# Patient Record
Sex: FEMALE | Race: WHITE | NOT HISPANIC OR LATINO | Employment: OTHER | URBAN - METROPOLITAN AREA
[De-identification: names, ages, dates, MRNs, and addresses within clinical notes are randomized per-mention and may not be internally consistent; named-entity substitution may affect disease eponyms.]

---

## 2017-01-25 DIAGNOSIS — J45.909 UNCOMPLICATED ASTHMA, UNSPECIFIED ASTHMA SEVERITY: ICD-10-CM

## 2017-01-25 RX ORDER — ALBUTEROL SULFATE 90 UG/1
AEROSOL, METERED RESPIRATORY (INHALATION)
Qty: 3 INHALER | Refills: 0 | Status: SHIPPED | OUTPATIENT
Start: 2017-01-25 | End: 2017-03-31 | Stop reason: SDUPTHER

## 2017-01-25 NOTE — TELEPHONE ENCOUNTER
Caller Name: Linda Garcia                 Call Back Number: [phone] (home)         Patient approves a detailed voicemail message: N\A    Have we ever prescribed this med? Yes.  If yes, what date? 10/24/16    Last OV: last seen at [facility]    Next OV: 3/6/17    DX: asthma    Medications:  Current Outpatient Prescriptions   Medication Sig Dispense Refill   • XYZAL 5 MG Tab TAKE 1 TABLET BY MOUTH EVERY DAY AS NEEDED 90 Tab 0   • VENTOLIN  (90 BASE) MCG/ACT Aero Soln inhalation aerosol INHALE 2 PUFFS BY MOUTH EVERY 4 HOURS AS NEEDED SHORTNESS OF BREATH 3 Inhaler 0   • SINGULAIR 10 MG Tab TAKE 1 TABLET BY MOUTH EVERY DAY 90 Tab 0   • simvastatin (ZOCOR) 5 MG Tab Take 5 mg by mouth every evening.     • Beclomethasone Dipropionate (QVAR INH) Inhale  by mouth.     • metoprolol (LOPRESSOR) 25 MG TABS Take 1 Tab by mouth 4 times a day as needed (palpitations). 360 Tab 1   • albuterol (VENTOLIN OR PROVENTIL) 108 (90 BASE) MCG/ACT AERS Inhale 2 Puffs by mouth every 6 hours as needed.       No current facility-administered medications for this visit.

## 2017-01-25 NOTE — TELEPHONE ENCOUNTER
Caller Name: Linda Garcia                 Call Back Number: [phone] (home)         Patient approves a detailed voicemail message: N\A    Have we ever prescribed this med? Yes.  If yes, what date? Both 10/24/16    Last OV: 10/9/15 [facility]    Next OV: 3/6/17 with [place] [facility]    DX: Asthma     Medications:  Current Outpatient Prescriptions   Medication Sig Dispense Refill   • XYZAL 5 MG Tab TAKE 1 TABLET BY MOUTH EVERY DAY AS NEEDED 90 Tab 0   • VENTOLIN  (90 BASE) MCG/ACT Aero Soln inhalation aerosol INHALE 2 PUFFS BY MOUTH EVERY 4 HOURS AS NEEDED SHORTNESS OF BREATH 3 Inhaler 0   • SINGULAIR 10 MG Tab TAKE 1 TABLET BY MOUTH EVERY DAY 90 Tab 0   • simvastatin (ZOCOR) 5 MG Tab Take 5 mg by mouth every evening.     • Beclomethasone Dipropionate (QVAR INH) Inhale  by mouth.     • metoprolol (LOPRESSOR) 25 MG TABS Take 1 Tab by mouth 4 times a day as needed (palpitations). 360 Tab 1   • albuterol (VENTOLIN OR PROVENTIL) 108 (90 BASE) MCG/ACT AERS Inhale 2 Puffs by mouth every 6 hours as needed.       No current facility-administered medications for this visit.

## 2017-01-27 ENCOUNTER — TELEPHONE (OUTPATIENT)
Dept: PULMONOLOGY | Facility: HOSPICE | Age: 52
End: 2017-01-27

## 2017-01-27 NOTE — TELEPHONE ENCOUNTER
MEDICATION PRIOR AUTHORIZATION NEEDED:    1. Name of Medication: Singulair 10mg    2. Requested By (Name of Pharmacy): [facility]     3. Is insurance on file current? yes    4. What is the name & phone number of the 3rd party payor? [facility] [phone]

## 2017-01-27 NOTE — TELEPHONE ENCOUNTER
MEDICATION PRIOR AUTHORIZATION NEEDED:    1. Name of Medication: Ventolin 108 (90 base)mcg    2. Requested By (Name of Pharmacy): [facility]     3. Is insurance on file current? yes    4. What is the name & phone number of the 3rd party payor? [facility] [phone]

## 2017-01-27 NOTE — TELEPHONE ENCOUNTER
MEDICATION PRIOR AUTHORIZATION NEEDED:    1. Name of Medication: Xyzal 5mg    2. Requested By (Name of Pharmacy): [facility]     3. Is insurance on file current? yes    4. What is the name & phone number of the 3rd party payor? [facility] [phone]

## 2017-02-14 NOTE — TELEPHONE ENCOUNTER
DOCUMENTATION OF PRIOR AUTH STATUS    1. Medication name and dose: Ventolin 108 (90base) mcg    2. Name and Phone # of Prescription coverage company: [employer] [phone]    3. Date Prior Auth was submitted: 1/27/2017    4. What information was given to obtain insurance decision: Clinical notes    5. Prior Auth letter Approved or Denied: Approved, Copay exception approved    6. Pharmacy notified: Yes    7. Patient notified: Yes

## 2017-02-14 NOTE — TELEPHONE ENCOUNTER
DOCUMENTATION OF PRIOR AUTH STATUS    1. Medication name and dose: Singulair 10mg    2. Name and Phone # of Prescription coverage company: [employer] [phone]    3. Date Prior Auth was submitted: 1/27/2017    4. What information was given to obtain insurance decision: Clinical notes    5. Prior Auth letter Approved or Denied: Approved, Copay exception approved    6. Pharmacy notified: Yes    7. Patient notified: Yes

## 2017-02-28 ENCOUNTER — OFFICE VISIT (OUTPATIENT)
Dept: PULMONOLOGY | Facility: HOSPICE | Age: 52
End: 2017-02-28
Payer: COMMERCIAL

## 2017-02-28 ENCOUNTER — NON-PROVIDER VISIT (OUTPATIENT)
Dept: PULMONOLOGY | Facility: HOSPICE | Age: 52
End: 2017-02-28
Payer: COMMERCIAL

## 2017-02-28 VITALS
SYSTOLIC BLOOD PRESSURE: 120 MMHG | BODY MASS INDEX: 36.16 KG/M2 | OXYGEN SATURATION: 94 % | TEMPERATURE: 97.5 F | RESPIRATION RATE: 18 BRPM | HEIGHT: 66 IN | WEIGHT: 225 LBS | DIASTOLIC BLOOD PRESSURE: 74 MMHG | HEART RATE: 82 BPM

## 2017-02-28 DIAGNOSIS — J30.1 NON-SEASONAL ALLERGIC RHINITIS DUE TO POLLEN: ICD-10-CM

## 2017-02-28 DIAGNOSIS — J45.30 MILD PERSISTENT ASTHMA WITHOUT COMPLICATION: ICD-10-CM

## 2017-02-28 DIAGNOSIS — G47.33 OSA (OBSTRUCTIVE SLEEP APNEA): ICD-10-CM

## 2017-02-28 PROCEDURE — 99214 OFFICE O/P EST MOD 30 MIN: CPT | Performed by: INTERNAL MEDICINE

## 2017-02-28 ASSESSMENT — PULMONARY FUNCTION TESTS
FVC: 2.78
FEV1/FVC_PERCENT_PREDICTED: 95
FVC_PREDICTED: 3.78
FEV1/FVC_PREDICTED: 78.84
FEV1/FVC: 74
FEV1_PREDICTED: 2.98
FEV1_PERCENT_CHANGE: 25
FEV1: 2.68
FVC: 3.48
FEV1: 2.07
FEV1/FVC_PERCENT_CHANGE: 116
FEV1_PREDICTED: 69
FEV1_PERCENT_CHANGE: 29
FEV1/FVC: 77.01
FVC_PERCENT_PREDICTED: 73

## 2017-02-28 ASSESSMENT — PATIENT HEALTH QUESTIONNAIRE - PHQ9: CLINICAL INTERPRETATION OF PHQ2 SCORE: 0

## 2017-02-28 NOTE — MR AVS SNAPSHOT
Linda Garcia   2017 1:30 PM   Non-Provider Visit   MRN: [id number]    Department:  [facility]   Dept Phone:  [phone]    Description:  Female : 1965   Provider:  Malena Lei M.D.           Reason for Visit     Asthma           Allergies as of 2017     Allergen Noted Reactions    Asa [Aspirin] 2016       Gadolinium 2013       HIVES    Red Dye 2013       [place] 2013       Sulfa Drugs 2013         You were diagnosed with     Mild persistent asthma without complication   [475523]         Vital Signs     Smoking Status                   Passive Smoke Exposure - Never Smoker           Basic Information     Date Of Birth Sex Race Ethnicity Preferred Language    1965 Female White Non- English      Problem List              ICD-10-CM Priority Class Noted - Resolved    DUB (dysfunctional uterine bleeding), s/p YANDY  N93.8   2013 - Present    ASTHMA    2013 - Present    Palpitations R00.2   2013 - Present      Health Maintenance        Date Due Completion Dates    IMM DTaP/Tdap/Td Vaccine (1 - Tdap) 1985 ---    IMM PNEUMOCOCCAL 19-64 (ADULT) MEDIUM RISK SERIES (1 of 1 - PPSV23) 1985 ---    PAP SMEAR 1987 ---    COLONOSCOPY 2016 ---    IMM INFLUENZA (1) 2016 ---    MAMMOGRAM 10/25/2017 10/25/2016, 2015, 2014, 2013, 2012, 2010, 2010, 12/3/2009, 12/3/2009, 10/3/2008, 10/3/2008, 2007, 2007, 2006, 2006, 2005            Current Immunizations     No immunizations on file.      Below and/or attached are the medications your provider expects you to take. Review all of your home medications and newly ordered medications with your provider and/or pharmacist. Follow medication instructions as directed by your provider and/or pharmacist. Please keep your medication list with you and share with your provider. Update the information when medications are  discontinued, doses are changed, or new medications (including over-the-counter products) are added; and carry medication information at all times in the event of emergency situations     Allergies:  ASA - (reactions not documented)     GADOLINIUM - (reactions not documented)     RED DYE - (reactions not documented)     STRAWBERRY - (reactions not documented)     SULFA DRUGS - (reactions not documented)               Medications  Valid as of: February 28, 2017 -  2:35 PM    Generic Name Brand Name Tablet Size Instructions for use    Albuterol Sulfate (Aero Soln) albuterol 108 (90 BASE) MCG/ACT Inhale 2 Puffs by mouth every 6 hours as needed.        Albuterol Sulfate (Aero Soln) VENTOLIN  (90 BASE) MCG/ACT INHALE 2 PUFFS BY MOUTH EVERY 4 HOURS AS NEEDED SHORTNESS OF BREATH        Albuterol Sulfate (Aero Soln) VENTOLIN  (90 BASE) MCG/ACT INHALE 2 PUFFS BY MOUTH EVERY 4 HOURS AS NEEDED SHORTNESS OF BREATH        Beclomethasone Dipropionate   Inhale  by mouth.        Beclomethasone Dipropionate (Aero Soln) QVAR 40 MCG/ACT Inhale 1 Puff by mouth 2 Times a Day.        Beclomethasone Dipropionate (Aero Soln) QVAR 80 MCG/ACT Inhale 2 Puffs by mouth 2 times a day. Use spacer. Rinse mouth after each use.        Levocetirizine Dihydrochloride (Tab) XYZAL 5 MG TAKE 1 TABLET BY MOUTH EVERY DAY AS NEEDED        Metoprolol Tartrate (Tab) LOPRESSOR 25 MG Take 1 Tab by mouth 4 times a day as needed (palpitations).        Montelukast Sodium (Tab) SINGULAIR 10 MG TAKE 1 TABLET BY MOUTH EVERY DAY        Simvastatin (Tab) ZOCOR 5 MG Take 5 mg by mouth every evening.        .                 Medicines prescribed today were sent to:     [id number] - [place], [place] - [address] [facility] [address]    [address] [place] [address]    Phone: [phone] Fax: [phone]    Open 24 Hours?: No      Medication refill instructions:       If your prescription bottle indicates you have medication  refills left, it is not necessary to call your provider’s office. Please contact your pharmacy and they will refill your medication.    If your prescription bottle indicates you do not have any refills left, you may request refills at any time through one of the following ways: The online Donde system (except Urgent Care), by calling your provider’s office, or by asking your pharmacy to contact your provider’s office with a refill request. Medication refills are processed only during regular business hours and may not be available until the next business day. Your provider may request additional information or to have a follow-up visit with you prior to refilling your medication.   *Please Note: Medication refills are assigned a new Rx number when refilled electronically. Your pharmacy may indicate that no refills were authorized even though a new prescription for the same medication is available at the pharmacy. Please request the medicine by name with the pharmacy before contacting your provider for a refill.           Donde Access Code: [email or web address]  Expires: 3/2/2017 12:12 PM    Donde  A secure, online tool to manage your health information     [employer]’s Donde® is a secure, online tool that connects you to your personalized health information from the privacy of your home -- day or night - making it very easy for you to manage your healthcare. Once the activation process is completed, you can even access your medical information using the Donde jefe, which is available for free in the Apple Jefe store or Google Play store.     Donde provides the following levels of access (as shown below):   My Chart Features   [facility] Doctor [facility]  Specialists [facility]  Urgent  Care Non-[facility]  Primary Care  Doctor   Email your healthcare team securely and privately 24/7 X X X    Manage appointments: schedule your next appointment; view details of past/upcoming appointments X      Request  prescription refills. X      View recent personal medical records, including lab and immunizations X X X X   View health record, including health history, allergies, medications X X X X   Read reports about your outpatient visits, procedures, consult and ER notes X X X X   See your discharge summary, which is a recap of your hospital and/or ER visit that includes your diagnosis, lab results, and care plan. X X       How to register for Embarke:  1. Go to  [email or web address].  2. Click on the Sign Up Now box, which takes you to the New Member Sign Up page. You will need to provide the following information:  a. Enter your Embarke Access Code exactly as it appears at the top of this page. (You will not need to use this code after you’ve completed the sign-up process. If you do not sign up before the expiration date, you must request a new code.)   b. Enter your date of birth.   c. Enter your home email address.   d. Click Submit, and follow the next screen’s instructions.  3. Create a Embarke ID. This will be your Embarke login ID and cannot be changed, so think of one that is secure and easy to remember.  4. Create a Embarke password. You can change your password at any time.  5. Enter your Password Reset Question and Answer. This can be used at a later time if you forget your password.   6. Enter your e-mail address. This allows you to receive e-mail notifications when new information is available in Embarke.  7. Click Sign Up. You can now view your health information.    For assistance activating your Embarke account, call [phone]

## 2017-02-28 NOTE — MR AVS SNAPSHOT
"        Linda Garcia   2017 2:00 PM   Office Visit   MRN: [id number]    Department:  [facility]   Dept Phone:  [phone]    Description:  Female : 1965   Provider:  Malena Lei M.D.           Reason for Visit     Results [place] results.      Allergies as of 2017     Allergen Noted Reactions    Asa [Aspirin] 2016       Gadolinium 2013       HIVES    Red Dye 2013       [place] 2013       Sulfa Drugs 2013         You were diagnosed with     Mild persistent asthma without complication   [115302]       Non-seasonal allergic rhinitis due to pollen   [9349296]       MARIELA (obstructive sleep apnea)   [581557]         Vital Signs     Blood Pressure Pulse Temperature Respirations Height Weight    120/74 mmHg 82 36.4 °C (97.5 °F) 18 1.676 m (5' 5.98\") 102.059 kg (225 lb)    Body Mass Index Oxygen Saturation Smoking Status             36.33 kg/m2 94% Passive Smoke Exposure - Never Smoker         Basic Information     Date Of Birth Sex Race Ethnicity Preferred Language    1965 Female White Non- English      Your appointments     2017  1:00 PM   Established Patient Pul with Malena Lei M.D.   [facility] [place] [facility] (--)    [address] [place] [address] [address]  [facility] [address] [phone]              Problem List              ICD-10-CM Priority Class Noted - Resolved    DUB (dysfunctional uterine bleeding), s/p YANDY  N93.8   2013 - Present    ASTHMA    2013 - Present    Palpitations R00.2   2013 - Present      Health Maintenance        Date Due Completion Dates    IMM DTaP/Tdap/Td Vaccine (1 - Tdap) 1985 ---    IMM PNEUMOCOCCAL 19-64 (ADULT) MEDIUM RISK SERIES (1 of 1 - PPSV23) 1985 ---    PAP SMEAR 1987 ---    COLONOSCOPY 2016 ---    IMM INFLUENZA (1) 2016 ---    MAMMOGRAM 10/25/2017 10/25/2016, 2015, 2014, 2013, 2012, 2010, 2010, 12/3/2009, 12/3/2009, 10/3/2008, " 10/3/2008, 9/20/2007, 9/20/2007, 9/6/2006, 9/6/2006, 8/16/2005            Current Immunizations     No immunizations on file.      Below and/or attached are the medications your provider expects you to take. Review all of your home medications and newly ordered medications with your provider and/or pharmacist. Follow medication instructions as directed by your provider and/or pharmacist. Please keep your medication list with you and share with your provider. Update the information when medications are discontinued, doses are changed, or new medications (including over-the-counter products) are added; and carry medication information at all times in the event of emergency situations     Allergies:  ASA - (reactions not documented)     GADOLINIUM - (reactions not documented)     RED DYE - (reactions not documented)     STRAWBERRY - (reactions not documented)     SULFA DRUGS - (reactions not documented)               Medications  Valid as of: February 28, 2017 -  2:50 PM    Generic Name Brand Name Tablet Size Instructions for use    Albuterol Sulfate (Aero Soln) albuterol 108 (90 BASE) MCG/ACT Inhale 2 Puffs by mouth every 6 hours as needed.        Albuterol Sulfate (Aero Soln) VENTOLIN  (90 BASE) MCG/ACT INHALE 2 PUFFS BY MOUTH EVERY 4 HOURS AS NEEDED SHORTNESS OF BREATH        Albuterol Sulfate (Aero Soln) VENTOLIN  (90 BASE) MCG/ACT INHALE 2 PUFFS BY MOUTH EVERY 4 HOURS AS NEEDED SHORTNESS OF BREATH        Beclomethasone Dipropionate   Inhale  by mouth.        Beclomethasone Dipropionate (Aero Soln) QVAR 40 MCG/ACT Inhale 1 Puff by mouth 2 Times a Day.        Beclomethasone Dipropionate (Aero Soln) QVAR 80 MCG/ACT Inhale 2 Puffs by mouth 2 times a day. Use spacer. Rinse mouth after each use.        Levocetirizine Dihydrochloride (Tab) XYZAL 5 MG TAKE 1 TABLET BY MOUTH EVERY DAY AS NEEDED        Metoprolol Tartrate (Tab) LOPRESSOR 25 MG Take 1 Tab by mouth 4 times a day as needed (palpitations).         Montelukast Sodium (Tab) SINGULAIR 10 MG TAKE 1 TABLET BY MOUTH EVERY DAY        Simvastatin (Tab) ZOCOR 5 MG Take 5 mg by mouth every evening.        .                 Medicines prescribed today were sent to:     [id number]  [place], [place] - [address] AT [facility] [address]    [address] [facility] [place] [address]    Phone: [phone] Fax: [phone]    Open 24 Hours?: No      Medication refill instructions:       If your prescription bottle indicates you have medication refills left, it is not necessary to call your provider’s office. Please contact your pharmacy and they will refill your medication.    If your prescription bottle indicates you do not have any refills left, you may request refills at any time through one of the following ways: The online ParasitX system (except Urgent Care), by calling your provider’s office, or by asking your pharmacy to contact your provider’s office with a refill request. Medication refills are processed only during regular business hours and may not be available until the next business day. Your provider may request additional information or to have a follow-up visit with you prior to refilling your medication.   *Please Note: Medication refills are assigned a new Rx number when refilled electronically. Your pharmacy may indicate that no refills were authorized even though a new prescription for the same medication is available at the pharmacy. Please request the medicine by name with the pharmacy before contacting your provider for a refill.        Your To Do List     Future Labs/Procedures Complete By Expires    OVERNIGHT HOME SLEEP STUDY  As directed 2/28/2018         ParasitX Access Code: [email or web address]  Expires: 3/2/2017 12:12 PM    ParasitX  A secure, online tool to manage your health information     [employer]’s ParasitX® is a secure, online tool that connects you to your personalized health information from the privacy of your home -- day or  night - making it very easy for you to manage your healthcare. Once the activation process is completed, you can even access your medical information using the [employer] jefe, which is available for free in the Apple Jefe store or Google Play store.     [employer] provides the following levels of access (as shown below):   My Chart Features   [facility] Doctor [facility]  Specialists [facility]  Urgent  Care Non-[facility]  Primary Care  Doctor   Email your healthcare team securely and privately 24/7 X X X    Manage appointments: schedule your next appointment; view details of past/upcoming appointments X      Request prescription refills. X      View recent personal medical records, including lab and immunizations X X X X   View health record, including health history, allergies, medications X X X X   Read reports about your outpatient visits, procedures, consult and ER notes X X X X   See your discharge summary, which is a recap of your hospital and/or ER visit that includes your diagnosis, lab results, and care plan. X X       How to register for [employer]A.:  1. Go to  [email or web address].  2. Click on the Sign Up Now box, which takes you to the New Member Sign Up page. You will need to provide the following information:  a. Enter your [employer] Access Code exactly as it appears at the top of this page. (You will not need to use this code after you’ve completed the sign-up process. If you do not sign up before the expiration date, you must request a new code.)   b. Enter your date of birth.   c. Enter your home email address.   d. Click Submit, and follow the next screen’s instructions.  3. Create a [employer] ID. This will be your [employer] login ID and cannot be changed, so think of one that is secure and easy to remember.  4. Create a [employer] password. You can change your password at any time.  5. Enter your Password Reset Question and Answer. This can be used at a later time if you forget your password.   6. Enter your  e-mail address. This allows you to receive e-mail notifications when new information is available in Inventables.  7. Click Sign Up. You can now view your health information.    For assistance activating your Inventables account, call [phone]

## 2017-02-28 NOTE — PROGRESS NOTES
Chief Complaint   Patient presents with   • Results     [place] results.       HPI: This patient is a 51 y.o. Female returns for routine annual follow-up of asthma and allergic rhinitis. She uses Qvar 80 µg 2 puffs daily, Singulair, and Xyzal which has been effectively controlling her asthma symptoms. She denies SHABNAM use. Spirometry shows FEV1 2.68 L or 90% predicted with significant bronchodilator response consistent with asthma. She denies asthma exacerbations over the past year. She has periodic cough and wheezing, principally with the inversion layers or seasonal allergies.  She has been experiencing palpitations, snoring, morning headaches and nonrestorative sleep over the past months. She underwent cardiac workup which was unremarkable.    Past Medical History   Diagnosis Date   • Allergic rhinitis    • Asthma    • Chickenpox        Social History     Social History   • Marital Status:      Spouse Name: N/A   • Number of Children: N/A   • Years of Education: N/A     Occupational History   • Not on file.     Social History Main Topics   • Smoking status: Passive Smoke Exposure - Never Smoker   • Smokeless tobacco: Never Used   • Alcohol Use: No   • Drug Use: No   • Sexual Activity: Not on file     Other Topics Concern   • Not on file     Social History Narrative       Family History   Problem Relation Age of Onset   • Heart Disease Father    • Cancer Father    • Lung Cancer Mother        Current Outpatient Prescriptions on File Prior to Visit   Medication Sig Dispense Refill   • XYZAL 5 MG Tab TAKE 1 TABLET BY MOUTH EVERY DAY AS NEEDED 90 Tab 0   • SINGULAIR 10 MG Tab TAKE 1 TABLET BY MOUTH EVERY DAY 90 Tab 0   • VENTOLIN  (90 BASE) MCG/ACT Aero Soln inhalation aerosol INHALE 2 PUFFS BY MOUTH EVERY 4 HOURS AS NEEDED SHORTNESS OF BREATH 3 Inhaler 0   • simvastatin (ZOCOR) 5 MG Tab Take 5 mg by mouth every evening.     • metoprolol (LOPRESSOR) 25 MG TABS Take 1 Tab by mouth 4 times a day as needed  "(palpitations). 360 Tab 1   • VENTOLIN  (90 BASE) MCG/ACT Aero Soln inhalation aerosol INHALE 2 PUFFS BY MOUTH EVERY 4 HOURS AS NEEDED SHORTNESS OF BREATH 3 Inhaler 0   • Beclomethasone Dipropionate (QVAR INH) Inhale  by mouth.     • albuterol (VENTOLIN OR PROVENTIL) 108 (90 BASE) MCG/ACT AERS Inhale 2 Puffs by mouth every 6 hours as needed.       No current facility-administered medications on file prior to visit.       Allergies: Asa; Gadolinium; Red dye; Strawberry; and Sulfa drugs    ROS:   Constitutional: Denies fevers, chills, night sweats, fatigue or weight loss  Eyes: Denies vision loss, pain, drainage, double vision  Ears, Nose, Throat: Denies earache, difficulty hearing, tinnitus, nasal congestion, hoarseness  Cardiovascular: Denies chest pain, tightness, palpitations, orthopnea or edema  Respiratory: As in history of present illness  Sleep: +daytime sleepiness, snoring, apneas, denies insomnia, +morning headaches  GI: Denies heartburn, dysphagia, nausea, abdominal pain, diarrhea or constipation  : Denies frequent urination, hematuria, discharge or painful urination  Musculoskeletal: Denies back pain, painful joints, sore muscles  Neurological: Denies weakness or headaches  Skin: No rashes    Blood pressure 120/74, pulse 82, temperature 36.4 °C (97.5 °F), resp. rate 18, height 1.676 m (5' 5.98\"), weight 102.059 kg (225 lb), SpO2 94 %.  Multi-Ox Readings  Multi Ox #1     O2 sat % at rest     O2 sat % on exertion     O2 sat average on exertion     Multi Ox #2     O2 sat % at rest     O2 sat % on exertion     O2 sat average on exertion       Oxygen Use     Oxygen Frequency     Duration of need     Is the patient mobile within the home?     CPAP Use?     BIPAP Use?     Servo Titration         Physical Exam:  Appearance: Well-nourished, well-developed, in no acute distress  HEENT: Normocephalic, atraumatic, white sclera, PERRLA, oropharynx clear, Mallampati 3  Neck: No adenopathy or " masses  Respiratory: no intercostal retractions or accessory muscle use  Lungs auscultation: Clear to auscultation bilaterally, good air movement  Cardiovascular: Regular rate rhythm. No murmurs, rubs or gallops.  No LE edema  Abdomen: soft, nondistended  Gait: Normal  Digits: No clubbing, cyanosis  Motor: No focal deficits  Orientation: Oriented to time, person and place    Diagnosis:  1. Mild persistent asthma without complication     2. Non-seasonal allergic rhinitis due to pollen     3. MARIELA (obstructive sleep apnea)  OVERNIGHT HOME SLEEP STUDY       Plan:  Patient's asthma is clinically stable on Qvar 80 µg 2 puffs daily, Singulair 10 mg daily at bedtime. Prescription refills provided.  Continue Xyzal 10 mg daily for allergic rhinitis.  She has snoring, morning headaches and nonrestorative sleep in the setting of a crowded airway and obesity, highly suspicious for obstructive sleep apnea. We will arrange for home screening polysomnography.  Return for after testing, follow up visit with Malena Lei MD.

## 2017-02-28 NOTE — PROCEDURES
Good patient effort & cooperation.  The results of this test meet the ATS standards for acceptability and repeatability.  Test was performed on the goAct/D spirometry system.  Predicted values were N-Froylan.  A bronchodilator of Ventolin HFA - 2 puffs with a spacer was administered to the patient.    Interpretation:  Spirometry shows mild airway obstruction with significant bronchodilator response consistent with asthma, best FEV1 2.68 L or 90% predicted.

## 2017-03-21 ENCOUNTER — HOSPITAL ENCOUNTER (OUTPATIENT)
Dept: LAB | Facility: MEDICAL CENTER | Age: 52
End: 2017-03-21
Attending: FAMILY MEDICINE
Payer: COMMERCIAL

## 2017-03-21 LAB
25(OH)D3 SERPL-MCNC: 31 NG/ML (ref 30–100)
ALT SERPL-CCNC: 14 U/L (ref 2–50)
ANION GAP SERPL CALC-SCNC: 8 MMOL/L (ref 0–11.9)
AST SERPL-CCNC: 14 U/L (ref 12–45)
BUN SERPL-MCNC: 18 MG/DL (ref 8–22)
CALCIUM SERPL-MCNC: 9.5 MG/DL (ref 8.5–10.5)
CHLORIDE SERPL-SCNC: 103 MMOL/L (ref 96–112)
CHOLEST SERPL-MCNC: 202 MG/DL (ref 100–199)
CO2 SERPL-SCNC: 25 MMOL/L (ref 20–33)
CREAT SERPL-MCNC: 0.69 MG/DL (ref 0.5–1.4)
GLUCOSE SERPL-MCNC: 94 MG/DL (ref 65–99)
HDLC SERPL-MCNC: 59 MG/DL
LDLC SERPL CALC-MCNC: 117 MG/DL
POTASSIUM SERPL-SCNC: 3.7 MMOL/L (ref 3.6–5.5)
SODIUM SERPL-SCNC: 136 MMOL/L (ref 135–145)
TRIGL SERPL-MCNC: 128 MG/DL (ref 0–149)
TSH SERPL DL<=0.005 MIU/L-ACNC: 1.87 UIU/ML (ref 0.3–3.7)

## 2017-03-21 PROCEDURE — 82306 VITAMIN D 25 HYDROXY: CPT

## 2017-03-21 PROCEDURE — 84460 ALANINE AMINO (ALT) (SGPT): CPT

## 2017-03-21 PROCEDURE — 80061 LIPID PANEL: CPT

## 2017-03-21 PROCEDURE — 84443 ASSAY THYROID STIM HORMONE: CPT

## 2017-03-21 PROCEDURE — 36415 COLL VENOUS BLD VENIPUNCTURE: CPT

## 2017-03-21 PROCEDURE — 84450 TRANSFERASE (AST) (SGOT): CPT

## 2017-03-21 PROCEDURE — 80048 BASIC METABOLIC PNL TOTAL CA: CPT

## 2017-04-04 ENCOUNTER — RX ONLY (OUTPATIENT)
Age: 52
Setting detail: RX ONLY
End: 2017-04-04

## 2017-04-06 ENCOUNTER — APPOINTMENT (OUTPATIENT)
Dept: PULMONOLOGY | Facility: HOSPICE | Age: 52
End: 2017-04-06
Payer: COMMERCIAL

## 2017-04-07 NOTE — TELEPHONE ENCOUNTER
DOCUMENTATION OF PRIOR AUTH STATUS    1. Medication name and dose: Xyzal 5 mg    2. Name and Phone # of Prescription coverage company: [employer] [phone]    3. Date Prior Auth was submitted: 1/27/2017    4. What information was given to obtain insurance decision: Clinical notes    5. Prior Auth letter Approved or Denied: Approved    6. Pharmacy notified: Yes    7. Patient notified: Yes

## 2017-04-12 ENCOUNTER — HOME STUDY (OUTPATIENT)
Dept: SLEEP MEDICINE | Facility: MEDICAL CENTER | Age: 52
End: 2017-04-12
Attending: INTERNAL MEDICINE
Payer: COMMERCIAL

## 2017-04-12 DIAGNOSIS — G47.33 OSA (OBSTRUCTIVE SLEEP APNEA): ICD-10-CM

## 2017-04-12 PROCEDURE — 95806 SLEEP STUDY UNATT&RESP EFFT: CPT | Performed by: INTERNAL MEDICINE

## 2017-04-21 DIAGNOSIS — J45.909 UNCOMPLICATED ASTHMA, UNSPECIFIED ASTHMA SEVERITY: ICD-10-CM

## 2017-04-21 NOTE — TELEPHONE ENCOUNTER
Have we ever prescribed this med? Yes.  If yes, what date? 01/25/2017    Last OV: 02/28/2017 - Dr. Lei    Next OV: none scheduled; to follow up after testing    DX: Asthma    Medications: Singulair

## 2017-04-25 ENCOUNTER — TELEPHONE (OUTPATIENT)
Dept: PULMONOLOGY | Facility: HOSPICE | Age: 52
End: 2017-04-25

## 2017-04-25 DIAGNOSIS — J45.909 UNCOMPLICATED ASTHMA, UNSPECIFIED ASTHMA SEVERITY: ICD-10-CM

## 2017-04-25 NOTE — TELEPHONE ENCOUNTER
Patient calling to get results of Home Sleep Study that was done 4/13/17. Please call [phone] with results.

## 2017-04-26 NOTE — TELEPHONE ENCOUNTER
Message left for patient. She is out of town at this time and will make appointment to follow once she returns to [place]. Voice message.

## 2017-06-02 ENCOUNTER — TELEPHONE (OUTPATIENT)
Dept: PULMONOLOGY | Facility: HOSPICE | Age: 52
End: 2017-06-02

## 2017-06-02 NOTE — TELEPHONE ENCOUNTER
Pharmacy called requesting to re-write the rx as the generic brand (Singular) and was okayed by EULALIA Barakat.

## 2017-06-27 ENCOUNTER — TELEPHONE (OUTPATIENT)
Dept: PULMONOLOGY | Facility: HOSPICE | Age: 52
End: 2017-06-27

## 2017-06-27 NOTE — TELEPHONE ENCOUNTER
Pt called requesting the results to her ss she had on 4/12/17, her next OV isn't until 12/6/17.    Last OV: 2/28/17  Asthma

## 2017-06-28 ENCOUNTER — SLEEP CENTER VISIT (OUTPATIENT)
Dept: SLEEP MEDICINE | Facility: MEDICAL CENTER | Age: 52
End: 2017-06-28
Payer: COMMERCIAL

## 2017-06-28 VITALS
WEIGHT: 225 LBS | DIASTOLIC BLOOD PRESSURE: 80 MMHG | OXYGEN SATURATION: 94 % | SYSTOLIC BLOOD PRESSURE: 124 MMHG | HEIGHT: 66 IN | RESPIRATION RATE: 16 BRPM | HEART RATE: 84 BPM | BODY MASS INDEX: 36.16 KG/M2

## 2017-06-28 DIAGNOSIS — J30.1 NON-SEASONAL ALLERGIC RHINITIS DUE TO POLLEN: ICD-10-CM

## 2017-06-28 DIAGNOSIS — G47.33 OSA (OBSTRUCTIVE SLEEP APNEA): ICD-10-CM

## 2017-06-28 DIAGNOSIS — J45.30 MILD PERSISTENT ASTHMA WITHOUT COMPLICATION: ICD-10-CM

## 2017-06-28 PROCEDURE — 99213 OFFICE O/P EST LOW 20 MIN: CPT | Performed by: INTERNAL MEDICINE

## 2017-06-28 NOTE — PROGRESS NOTES
Chief Complaint   Patient presents with   • Follow-Up     HST results       HPI: This patient is a 51 y.o. Female who returns to discuss sleep study results. She has mild asthma, on Qvar 80 micrograms, Singulair and Xyzal with stable asthma symptoms. She denies SHABNAM use. Her spirometry shows normal FEV1 at 2.68 L or 90%. She has snoring and morning headaches with nonrestorative sleep, and underwent home polysomnography confirming mild MARIELA with AHI 14 events per hour. There were no significant associated oxygen desaturations with only 3 minutes spent below 88% SPO2.   We discussed treatment options including AutoPap, UPPP or a dental appliance, and she was amenable to a CPAP trial.    Past Medical History   Diagnosis Date   • Allergic rhinitis    • Asthma    • Chickenpox        Social History     Social History   • Marital Status:      Spouse Name: N/A   • Number of Children: N/A   • Years of Education: N/A     Occupational History   • Not on file.     Social History Main Topics   • Smoking status: Passive Smoke Exposure - Never Smoker   • Smokeless tobacco: Never Used   • Alcohol Use: No   • Drug Use: No   • Sexual Activity: Not on file     Other Topics Concern   • Not on file     Social History Narrative       Family History   Problem Relation Age of Onset   • Heart Disease Father    • Cancer Father    • Lung Cancer Mother        Current Outpatient Prescriptions on File Prior to Visit   Medication Sig Dispense Refill   • SINGULAIR 10 MG Tab Take 1 Tab by mouth every day. 90 Tab 3   • SINGULAIR 10 MG Tab Take 1 Tab by mouth every day. 30 Tab 11   • VENTOLIN  (90 BASE) MCG/ACT Aero Soln inhalation aerosol INHALE 2 PUFFS BY MOUTH EVERY 4 HOURS AS NEEDED FOR SHORTNESS OF BREATH 1 Inhaler 5   • beclomethasone (QVAR) 40 MCG/ACT inhaler Inhale 1 Puff by mouth 2 Times a Day.     • beclomethasone (QVAR) 80 MCG/ACT inhaler Inhale 2 Puffs by mouth 2 times a day. Use spacer. Rinse mouth after each use. 1 Inhaler  6   • XYZAL 5 MG Tab TAKE 1 TABLET BY MOUTH EVERY DAY AS NEEDED 90 Tab 0   • VENTOLIN  (90 BASE) MCG/ACT Aero Soln inhalation aerosol INHALE 2 PUFFS BY MOUTH EVERY 4 HOURS AS NEEDED SHORTNESS OF BREATH 3 Inhaler 0   • simvastatin (ZOCOR) 5 MG Tab Take 5 mg by mouth every evening.     • Beclomethasone Dipropionate (QVAR INH) Inhale  by mouth.     • metoprolol (LOPRESSOR) 25 MG TABS Take 1 Tab by mouth 4 times a day as needed (palpitations). 360 Tab 1   • albuterol (VENTOLIN OR PROVENTIL) 108 (90 BASE) MCG/ACT AERS Inhale 2 Puffs by mouth every 6 hours as needed.       No current facility-administered medications on file prior to visit.       Allergies: Asa; Gadolinium; Red dye; Strawberry; and Sulfa drugs    ROS:   Constitutional: Denies fevers, chills, night sweats, fatigue or weight loss  Eyes: Denies vision loss, pain, drainage, double vision  Ears, Nose, Throat: Denies earache, difficulty hearing, tinnitus, nasal congestion, hoarseness  Cardiovascular: Denies chest pain, tightness, palpitations, orthopnea or edema  Respiratory: Denies shortness of breath, cough, wheezing, hemoptysis  Sleep: As in history of present illness  GI: Denies heartburn, dysphagia, nausea, abdominal pain, diarrhea or constipation  : Denies frequent urination, hematuria, discharge or painful urination  Musculoskeletal: Denies back pain, painful joints, sore muscles  Neurological: Denies weakness or headaches  Skin: No rashes    There were no vitals taken for this visit.    Physical Exam:  Appearance: Well-nourished, well-developed, in no acute distress  HEENT: Normocephalic, atraumatic, white sclera, PERRLA, oropharynx clear, Mallampati 3  Neck: No adenopathy or masses  Respiratory: no intercostal retractions or accessory muscle use  Lungs auscultation: Clear to auscultation bilaterally  Cardiovascular: Regular rate rhythm. No murmurs, rubs or gallops.  No LE edema  Abdomen: soft, nondistended  Gait: Normal  Digits: No clubbing,  cyanosis  Motor: No focal deficits  Orientation: Oriented to time, person and place    Diagnosis:  1. MARIELA (obstructive sleep apnea)     2. Mild persistent asthma without complication     3. Non-seasonal allergic rhinitis due to pollen         Plan:  Patient has mild MARIELA. She will start AutoPap 5-20 centimeters of water using nasal pillows. We also discussed dietary and exercise modification for weight loss.  Her asthma has been mild, clinically and stable on Qvar 80 micrograms and Singulair.  Return in about 8 weeks (around 8/23/2017). for CPAP compliance download to monitor response to treatment. If she is intolerant of CPAP, then a dental appliance could be considered.

## 2017-06-28 NOTE — MR AVS SNAPSHOT
"        Linda Garcia   2017 2:40 PM   Sleep Center Visit   MRN: [id number]    Department:  [facility]   Dept Phone:  [phone]    Description:  Female : 1965   Provider:  Malena Lei M.D.           Reason for Visit     Follow-Up HST results      Allergies as of 2017     Allergen Noted Reactions    Asa [Aspirin] 2016       Gadolinium 2013       HIVES    Red Dye 2013       Kelso 2013       Sulfa Drugs 2013         You were diagnosed with     MARIELA (obstructive sleep apnea)   [183431]       Mild persistent asthma without complication   [485083]       Non-seasonal allergic rhinitis due to pollen   [6494860]         Vital Signs     Blood Pressure Pulse Respirations Height Weight Body Mass Index    124/80 mmHg 84 16 1.676 m (5' 5.98\") 102.059 kg (225 lb) 36.33 kg/m2    Oxygen Saturation Smoking Status                94% Passive Smoke Exposure - Never Smoker          Basic Information     Date Of Birth Sex Race Ethnicity Preferred Language    1965 Female White Non- English      Your appointments     Aug 23, 2017  1:20 PM   Follow UP with GILL Giron   [facility] (--)    [address]  [place] [id number] [phone]              Problem List              ICD-10-CM Priority Class Noted - Resolved    DUB (dysfunctional uterine bleeding), s/p YANDY  N93.8   2013 - Present    ASTHMA    2013 - Present    Palpitations R00.2   2013 - Present      Health Maintenance        Date Due Completion Dates    IMM DTaP/Tdap/Td Vaccine (1 - Tdap) 1985 ---    IMM PNEUMOCOCCAL 19-64 (ADULT) MEDIUM RISK SERIES (1 of 1 - PPSV23) 1985 ---    PAP SMEAR 1987 ---    COLONOSCOPY 2016 ---    MAMMOGRAM 10/25/2017 10/25/2016, 2015, 2014, 2013, 2012, 2010, 2010, 12/3/2009, 12/3/2009, 10/3/2008, 10/3/2008, 2007, 2007, 2006, 2006, " 8/16/2005            Current Immunizations     No immunizations on file.      Below and/or attached are the medications your provider expects you to take. Review all of your home medications and newly ordered medications with your provider and/or pharmacist. Follow medication instructions as directed by your provider and/or pharmacist. Please keep your medication list with you and share with your provider. Update the information when medications are discontinued, doses are changed, or new medications (including over-the-counter products) are added; and carry medication information at all times in the event of emergency situations     Allergies:  ASA - (reactions not documented)     GADOLINIUM - (reactions not documented)     RED DYE - (reactions not documented)     STRAWBERRY - (reactions not documented)     SULFA DRUGS - (reactions not documented)               Medications  Valid as of: June 28, 2017 -  3:18 PM    Generic Name Brand Name Tablet Size Instructions for use    Albuterol Sulfate (Aero Soln) albuterol 108 (90 BASE) MCG/ACT Inhale 2 Puffs by mouth every 6 hours as needed.        Albuterol Sulfate (Aero Soln) VENTOLIN  (90 BASE) MCG/ACT INHALE 2 PUFFS BY MOUTH EVERY 4 HOURS AS NEEDED SHORTNESS OF BREATH        Albuterol Sulfate (Aero Soln) VENTOLIN  (90 BASE) MCG/ACT INHALE 2 PUFFS BY MOUTH EVERY 4 HOURS AS NEEDED FOR SHORTNESS OF BREATH        Beclomethasone Dipropionate   Inhale  by mouth.        Beclomethasone Dipropionate (Aero Soln) QVAR 40 MCG/ACT Inhale 1 Puff by mouth 2 Times a Day.        Beclomethasone Dipropionate (Aero Soln) QVAR 80 MCG/ACT Inhale 2 Puffs by mouth 2 times a day. Use spacer. Rinse mouth after each use.        Levocetirizine Dihydrochloride (Tab) XYZAL 5 MG TAKE 1 TABLET BY MOUTH EVERY DAY AS NEEDED        Metoprolol Tartrate (Tab) LOPRESSOR 25 MG Take 1 Tab by mouth 4 times a day as needed (palpitations).        Montelukast Sodium (Tab) SINGULAIR 10 MG Take 1 Tab  by mouth every day.        Montelukast Sodium (Tab) SINGULAIR 10 MG Take 1 Tab by mouth every day.        Simvastatin (Tab) ZOCOR 5 MG Take 5 mg by mouth every evening.        .                 Medicines prescribed today were sent to:     [id number]  [place], [place] - [address] AT [facility] [address]    [address] [place] [address]    Phone: [phone] Fax: [phone]    Open 24 Hours?: No      Medication refill instructions:       If your prescription bottle indicates you have medication refills left, it is not necessary to call your provider’s office. Please contact your pharmacy and they will refill your medication.    If your prescription bottle indicates you do not have any refills left, you may request refills at any time through one of the following ways: The online [employer] system (except Urgent Care), by calling your provider’s office, or by asking your pharmacy to contact your provider’s office with a refill request. Medication refills are processed only during regular business hours and may not be available until the next business day. Your provider may request additional information or to have a follow-up visit with you prior to refilling your medication.   *Please Note: Medication refills are assigned a new Rx number when refilled electronically. Your pharmacy may indicate that no refills were authorized even though a new prescription for the same medication is available at the pharmacy. Please request the medicine by name with the pharmacy before contacting your provider for a refill.           [employer] Access Code: [email or web address]  Expires: 7/28/2017  2:49 PM    [employer]  A secure, online tool to manage your health information     [employer]® is a secure, online tool that connects you to your personalized health information from the privacy of your home -- day or night - making it very easy for you to manage your healthcare. Once the activation process is  completed, you can even access your medical information using the WeDeliver jefe, which is available for free in the Apple Jefe store or Google Play store.     WeDeliver provides the following levels of access (as shown below):   My Chart Features   [facility] Doctor [facility]  Specialists [facility]  Urgent  Care Non-[facility]  Primary Care  Doctor   Email your healthcare team securely and privately 24/7 X X X    Manage appointments: schedule your next appointment; view details of past/upcoming appointments X      Request prescription refills. X      View recent personal medical records, including lab and immunizations X X X X   View health record, including health history, allergies, medications X X X X   Read reports about your outpatient visits, procedures, consult and ER notes X X X X   See your discharge summary, which is a recap of your hospital and/or ER visit that includes your diagnosis, lab results, and care plan. X X       How to register for WeDeliver:  1. Go to  [email or web address].  2. Click on the Sign Up Now box, which takes you to the New Member Sign Up page. You will need to provide the following information:  a. Enter your WeDeliver Access Code exactly as it appears at the top of this page. (You will not need to use this code after you’ve completed the sign-up process. If you do not sign up before the expiration date, you must request a new code.)   b. Enter your date of birth.   c. Enter your home email address.   d. Click Submit, and follow the next screen’s instructions.  3. Create a WeDeliver ID. This will be your WeDeliver login ID and cannot be changed, so think of one that is secure and easy to remember.  4. Create a WeDeliver password. You can change your password at any time.  5. Enter your Password Reset Question and Answer. This can be used at a later time if you forget your password.   6. Enter your e-mail address. This allows you to receive e-mail notifications when new information is  available in [employer].  7. Click Sign Up. You can now view your health information.    For assistance activating your Event[employer] account, call [phone]

## 2017-08-03 ENCOUNTER — HOSPITAL ENCOUNTER (OUTPATIENT)
Dept: RADIOLOGY | Facility: MEDICAL CENTER | Age: 52
End: 2017-08-03
Attending: FAMILY MEDICINE
Payer: COMMERCIAL

## 2017-08-03 DIAGNOSIS — N63.0 LUMP OR MASS IN BREAST: ICD-10-CM

## 2017-08-03 PROCEDURE — 76642 ULTRASOUND BREAST LIMITED: CPT | Mod: LT

## 2017-08-03 PROCEDURE — G0279 TOMOSYNTHESIS, MAMMO: HCPCS | Mod: LT

## 2017-08-23 ENCOUNTER — APPOINTMENT (OUTPATIENT)
Dept: SLEEP MEDICINE | Facility: MEDICAL CENTER | Age: 52
End: 2017-08-23
Payer: COMMERCIAL

## 2017-08-23 ENCOUNTER — SLEEP CENTER VISIT (OUTPATIENT)
Dept: SLEEP MEDICINE | Facility: MEDICAL CENTER | Age: 52
End: 2017-08-23
Payer: COMMERCIAL

## 2017-08-23 VITALS
RESPIRATION RATE: 16 BRPM | OXYGEN SATURATION: 95 % | SYSTOLIC BLOOD PRESSURE: 118 MMHG | WEIGHT: 200 LBS | HEART RATE: 80 BPM | HEIGHT: 66 IN | BODY MASS INDEX: 32.14 KG/M2 | DIASTOLIC BLOOD PRESSURE: 69 MMHG

## 2017-08-23 DIAGNOSIS — G47.33 OBSTRUCTIVE SLEEP APNEA: ICD-10-CM

## 2017-08-23 DIAGNOSIS — J45.30 MILD PERSISTENT ASTHMA WITHOUT COMPLICATION: ICD-10-CM

## 2017-08-23 DIAGNOSIS — E66.9 OBESITY (BMI 30.0-34.9): ICD-10-CM

## 2017-08-23 DIAGNOSIS — J30.89 NON-SEASONAL ALLERGIC RHINITIS, UNSPECIFIED ALLERGIC RHINITIS TRIGGER: ICD-10-CM

## 2017-08-23 PROCEDURE — 99213 OFFICE O/P EST LOW 20 MIN: CPT | Performed by: NURSE PRACTITIONER

## 2017-08-23 NOTE — PROGRESS NOTES
Chief Complaint   Patient presents with   • Follow-Up       HPI:  Linda Garcia is a 52 y.o. year old female here today for follow-up on MARIELA. First compliance check.  She has mild asthma, on Qvar 80 micrograms, Singulair and Xyzal with stable asthma symptoms. She denies SHABNAM use. Her spirometry 2/2017 shows normal FEV1 at 2.68 L or 90%. She has snoring and morning headaches with nonrestorative sleep, and underwent home polysomnography confirming mild MARIELA with AHI 14 events per hour. There were no significant associated oxygen desaturations with only 3 minutes spent below 88% SPO2. She was started on Autocpap 5-20cm H20 nightly. Compliance card 7/24/17-8/22/17 indicates 66.7% compliance, avg nightly use of 6hr 17min, AHi 4.0, mean pressures 8.3cm and minimal mask leaking. She notes minimal leaking of mask, but using nasal mask her mouth has been coming open at night and when she attempts to close her mouth she is having wearing of her front teeth. We will perform mask fit with full face. She notes a few days of missed usage on cpap due to travel and no access to power outlets. She is unsure if energy/sleep has improved with cpap use but headaches have become better. She denies EDS. She denies cardiac or respiratory symptoms.          ROS: As per HPI and otherwise negative if not stated.    Past Medical History   Diagnosis Date   • Allergic rhinitis    • Asthma    • Chickenpox        Past Surgical History   Procedure Laterality Date   • Vaginal hysterectomy scope total  2/5/2009     Performed by GHULAM DAVILA at SURGERY SAME DAY [facility] ORS   • Cystoscopy  2/5/2009     Performed by GHULAM DAVILA at SURGERY SAME DAY [facility] ORS   • Breast biopsy  1996   • Arthroscopy, knee         Family History   Problem Relation Age of Onset   • Heart Disease Father    • Cancer Father    • Lung Cancer Mother        Social History     Social History   • Marital Status:      Spouse Name: N/A   • Number of Children:  "N/A   • Years of Education: N/A     Occupational History   • Not on file.     Social History Main Topics   • Smoking status: Passive Smoke Exposure - Never Smoker   • Smokeless tobacco: Never Used   • Alcohol Use: No   • Drug Use: No   • Sexual Activity: Not on file     Other Topics Concern   • Not on file     Social History Narrative       Allergies as of 08/23/2017 - Joey as Reviewed 08/23/2017   Allergen Reaction Noted   • Asa [aspirin]  09/14/2016   • Gadolinium  11/04/2013   • Red dye  04/05/2013   • [place]  04/05/2013   • Sulfa drugs  04/05/2013        @Vital signs for this encounter:  Filed Vitals:    08/23/17 1433   Height: 1.676 m (5' 5.98\")   Weight: 90.719 kg (200 lb)   Weight % change since last entry.: 0 %   BP: 118/69   Pulse: 80   BMI (Calculated): 32.3   Resp: 16   O2 sat % room air: 95 %       Current medications as of today   Current Outpatient Prescriptions   Medication Sig Dispense Refill   • SINGULAIR 10 MG Tab Take 1 Tab by mouth every day. 30 Tab 11   • beclomethasone (QVAR) 80 MCG/ACT inhaler Inhale 2 Puffs by mouth 2 times a day. Use spacer. Rinse mouth after each use. 1 Inhaler 6   • XYZAL 5 MG Tab TAKE 1 TABLET BY MOUTH EVERY DAY AS NEEDED 90 Tab 0   • VENTOLIN  (90 BASE) MCG/ACT Aero Soln inhalation aerosol INHALE 2 PUFFS BY MOUTH EVERY 4 HOURS AS NEEDED SHORTNESS OF BREATH 3 Inhaler 0   • simvastatin (ZOCOR) 5 MG Tab Take 5 mg by mouth every evening.     • metoprolol (LOPRESSOR) 25 MG TABS Take 1 Tab by mouth 4 times a day as needed (palpitations). 360 Tab 1   • SINGULAIR 10 MG Tab Take 1 Tab by mouth every day. 90 Tab 3   • VENTOLIN  (90 BASE) MCG/ACT Aero Soln inhalation aerosol INHALE 2 PUFFS BY MOUTH EVERY 4 HOURS AS NEEDED FOR SHORTNESS OF BREATH 1 Inhaler 5   • beclomethasone (QVAR) 40 MCG/ACT inhaler Inhale 1 Puff by mouth 2 Times a Day.     • Beclomethasone Dipropionate (QVAR INH) Inhale  by mouth.     • albuterol (VENTOLIN OR PROVENTIL) 108 (90 BASE) MCG/ACT " AERS Inhale 2 Puffs by mouth every 6 hours as needed.       No current facility-administered medications for this visit.         Physical Exam:   Gen:           Alert and oriented, No apparent distress. Mood and affect appropriate, normal interaction with examiner.  Eyes:          PERRL, EOM intact, sclere white, conjunctive moist.  Ears:          Not examined.   Hearing:     Grossly intact.  Nose:          Normal, no lesions or deformities.  Dentition:    Good dentition.  Oropharynx:   Tongue normal, posterior pharynx without erythema or exudate.  Mallampati Classification: not examined.  Neck:        Supple, trachea midline, no masses.  Respiratory Effort: No intercostal retractions or use of accessory muscles.   Lung Auscultation:      Clear to auscultation bilaterally; no rales, rhonchi or wheezing.  CV:            Regular rate and rhythm. No murmurs, rubs or gallops.  Abd:           Not examined.   Lymphadenopathy: Not examined.  Gait and Station: Normal.  Digits and Nails: No clubbing, cyanosis, petechiae, or nodes.   Cranial Nerves: II-XII grossly intact.  Skin:        No rashes, lesions or ulcers noted.               Ext:           No cyanosis or edema.      Assessment:  1. Obstructive sleep apnea  MASK FITTING    DME CNOX BY DME CO   2. Mild persistent asthma without complication  CANCELED: HEIGHT AND WEIGHT   3. Non-seasonal allergic rhinitis, unspecified allergic rhinitis trigger     4. Obesity (BMI 30.0-34.9)  HEIGHT AND WEIGHT       Immunizations:    Flu:not given  Pneumovax 23:not given  Prevnar 13:not given    Plan:  1. Continue CPAP nightly. Patient understands the need to use device every night for >4hrs to meet compliance standards for insurance purposes. Discussed battery use.  2. Mask fit in office. Small headgear with sports mouth guard in place.  3. DME CNOX on pap prior to next OV.  4. Encouraged weight loss.  5. Continue inhaler regimen.  6. Follow up in 3 months with compliance card/CNOX,  sooner if needed.

## 2017-08-23 NOTE — MR AVS SNAPSHOT
"        Linda Garcia   2017 2:40 PM   Sleep Center Visit   MRN: [id number]    Department:  [facility]   Dept Phone:  [phone]    Description:  Female : 1965   Provider:  GILL Mederos           Reason for Visit     Follow-Up           Allergies as of 2017     Allergen Noted Reactions    Asa [Aspirin] 2016       Gadolinium 2013       HIVES    Red Dye 2013       [place] 2013       Sulfa Drugs 2013         You were diagnosed with     Obstructive sleep apnea   [054493]       Mild persistent asthma without complication   [409511]       Non-seasonal allergic rhinitis, unspecified allergic rhinitis trigger   [1432119]       Obesity (BMI 30.0-34.9)   [762306]         Vital Signs     Blood Pressure Pulse Respirations Height Weight Body Mass Index    118/69 mmHg 80 16 1.676 m (5' 5.98\") 90.719 kg (200 lb) 32.30 kg/m2    Oxygen Saturation Smoking Status                95% Passive Smoke Exposure - Never Smoker          Basic Information     Date Of Birth Sex Race Ethnicity Preferred Language    1965 Female White Non- English      Your appointments     Oct 25, 2017  1:20 PM   Follow UP with GILL Giron   [facility] (--)    [address] [place] [id number] [phone]              Problem List              ICD-10-CM Priority Class Noted - Resolved    DUB (dysfunctional uterine bleeding), s/p YANDY 2009 N93.8   2013 - Present    ASTHMA    2013 - Present    Palpitations R00.2   2013 - Present    Obstructive sleep apnea G47.33   2017 - Present    Mild persistent asthma without complication J45.30   2017 - Present    Non-seasonal allergic rhinitis J30.89   2017 - Present    Obesity (BMI 30.0-34.9) E66.9   2017 - Present      Health Maintenance        Date Due Completion Dates    IMM DTaP/Tdap/Td Vaccine (1 - Tdap) 1985 ---    IMM PNEUMOCOCCAL 19-64 " (ADULT) MEDIUM RISK SERIES (1 of 1 - PPSV23) 7/29/1985 ---    PAP SMEAR 7/29/1987 ---    COLONOSCOPY 7/29/2016 ---    IMM INFLUENZA (1) 9/1/2017 ---    MAMMOGRAM 8/3/2018 8/3/2017, 10/25/2016, 4/30/2015, 4/25/2014, 4/17/2013, 1/19/2012, 4/2/2010, 4/2/2010, 12/3/2009, 12/3/2009, 10/3/2008, 10/3/2008, 9/20/2007, 9/20/2007, 9/6/2006, 9/6/2006, 8/16/2005            Current Immunizations     No immunizations on file.      Below and/or attached are the medications your provider expects you to take. Review all of your home medications and newly ordered medications with your provider and/or pharmacist. Follow medication instructions as directed by your provider and/or pharmacist. Please keep your medication list with you and share with your provider. Update the information when medications are discontinued, doses are changed, or new medications (including over-the-counter products) are added; and carry medication information at all times in the event of emergency situations     Allergies:  ASA - (reactions not documented)     GADOLINIUM - (reactions not documented)     RED DYE - (reactions not documented)     STRAWBERRY - (reactions not documented)     SULFA DRUGS - (reactions not documented)               Medications  Valid as of: August 23, 2017 -  3:37 PM    Generic Name Brand Name Tablet Size Instructions for use    Albuterol Sulfate (Aero Soln) albuterol 108 (90 Base) MCG/ACT Inhale 2 Puffs by mouth every 6 hours as needed.        Albuterol Sulfate (Aero Soln) VENTOLIN  (90 Base) MCG/ACT INHALE 2 PUFFS BY MOUTH EVERY 4 HOURS AS NEEDED SHORTNESS OF BREATH        Albuterol Sulfate (Aero Soln) VENTOLIN  (90 Base) MCG/ACT INHALE 2 PUFFS BY MOUTH EVERY 4 HOURS AS NEEDED FOR SHORTNESS OF BREATH        Beclomethasone Dipropionate   Inhale  by mouth.        Beclomethasone Dipropionate (Aero Soln) QVAR 40 MCG/ACT Inhale 1 Puff by mouth 2 Times a Day.        Beclomethasone Dipropionate (Aero Soln) QVAR 80 MCG/ACT  Inhale 2 Puffs by mouth 2 times a day. Use spacer. Rinse mouth after each use.        Levocetirizine Dihydrochloride (Tab) XYZAL 5 MG TAKE 1 TABLET BY MOUTH EVERY DAY AS NEEDED        Metoprolol Tartrate (Tab) LOPRESSOR 25 MG Take 1 Tab by mouth 4 times a day as needed (palpitations).        Montelukast Sodium (Tab) SINGULAIR 10 MG Take 1 Tab by mouth every day.        Montelukast Sodium (Tab) SINGULAIR 10 MG Take 1 Tab by mouth every day.        Simvastatin (Tab) ZOCOR 5 MG Take 5 mg by mouth every evening.        .                 Medicines prescribed today were sent to:     [id number]  [place], [place] - [address] AT [facility] [address]    [address] [address]    Phone: [phone] Fax: [phone]    Open 24 Hours?: No      Medication refill instructions:       If your prescription bottle indicates you have medication refills left, it is not necessary to call your provider’s office. Please contact your pharmacy and they will refill your medication.    If your prescription bottle indicates you do not have any refills left, you may request refills at any time through one of the following ways: The online [employer] system (except Urgent Care), by calling your provider’s office, or by asking your pharmacy to contact your provider’s office with a refill request. Medication refills are processed only during regular business hours and may not be available until the next business day. Your provider may request additional information or to have a follow-up visit with you prior to refilling your medication.   *Please Note: Medication refills are assigned a new Rx number when refilled electronically. Your pharmacy may indicate that no refills were authorized even though a new prescription for the same medication is available at the pharmacy. Please request the medicine by name with the pharmacy before contacting your provider for a refill.        Your To Do List     Future  Labs/Procedures Complete By Expires    MASK FITTING  As directed 8/23/2018         [employer] Access Code: [email or web address]  Expires: 9/22/2017  1:52 PM    [employer]  A secure, online tool to manage your health information     [employer]’s [employer]® is a secure, online tool that connects you to your personalized health information from the privacy of your home -- day or night - making it very easy for you to manage your healthcare. Once the activation process is completed, you can even access your medical information using the [employer] jefe, which is available for free in the Apple Jefe store or Google Play store.     [employer] provides the following levels of access (as shown below):   My Chart Features   [facility] Doctor [facility]  Specialists [facility]  Urgent  Care Non-[facility]  Primary Care  Doctor   Email your healthcare team securely and privately 24/7 X X X    Manage appointments: schedule your next appointment; view details of past/upcoming appointments X      Request prescription refills. X      View recent personal medical records, including lab and immunizations X X X X   View health record, including health history, allergies, medications X X X X   Read reports about your outpatient visits, procedures, consult and ER notes X X X X   See your discharge summary, which is a recap of your hospital and/or ER visit that includes your diagnosis, lab results, and care plan. X X       How to register for [employer]:  1. Go to  [email or web address].  2. Click on the Sign Up Now box, which takes you to the New Member Sign Up page. You will need to provide the following information:  a. Enter your [employer] Access Code exactly as it appears at the top of this page. (You will not need to use this code after you’ve completed the sign-up process. If you do not sign up before the expiration date, you must request a new code.)   b. Enter your date of birth.   c. Enter your home email address.   d. Click Submit, and follow  the next screen’s instructions.  3. Create a [employer] ID. This will be your [employer] login ID and cannot be changed, so think of one that is secure and easy to remember.  4. Create a [employer] password. You can change your password at any time.  5. Enter your Password Reset Question and Answer. This can be used at a later time if you forget your password.   6. Enter your e-mail address. This allows you to receive e-mail notifications when new information is available in [employer].  7. Click Sign Up. You can now view your health information.    For assistance activating your [employer] account, call [phone]

## 2017-10-06 ENCOUNTER — HOSPITAL ENCOUNTER (OUTPATIENT)
Dept: LAB | Facility: MEDICAL CENTER | Age: 52
End: 2017-10-06
Attending: FAMILY MEDICINE
Payer: COMMERCIAL

## 2017-10-06 LAB
25(OH)D3 SERPL-MCNC: 23 NG/ML (ref 30–100)
ALT SERPL-CCNC: 15 U/L (ref 2–50)
AST SERPL-CCNC: 16 U/L (ref 12–45)
CHOLEST SERPL-MCNC: 192 MG/DL (ref 100–199)
HDLC SERPL-MCNC: 64 MG/DL
LDLC SERPL CALC-MCNC: 94 MG/DL
TRIGL SERPL-MCNC: 170 MG/DL (ref 0–149)

## 2017-10-06 PROCEDURE — 84460 ALANINE AMINO (ALT) (SGPT): CPT

## 2017-10-06 PROCEDURE — 82306 VITAMIN D 25 HYDROXY: CPT

## 2017-10-06 PROCEDURE — 80061 LIPID PANEL: CPT

## 2017-10-06 PROCEDURE — 84450 TRANSFERASE (AST) (SGOT): CPT

## 2017-10-06 PROCEDURE — 36415 COLL VENOUS BLD VENIPUNCTURE: CPT

## 2017-10-16 DIAGNOSIS — J45.909 UNCOMPLICATED ASTHMA, UNSPECIFIED ASTHMA SEVERITY, UNSPECIFIED WHETHER PERSISTENT: ICD-10-CM

## 2017-10-16 RX ORDER — ALBUTEROL SULFATE 2.5 MG/3ML
2.5 SOLUTION RESPIRATORY (INHALATION) EVERY 4 HOURS PRN
Qty: 150 ML | Refills: 0 | Status: SHIPPED | OUTPATIENT
Start: 2017-10-16

## 2017-10-16 NOTE — TELEPHONE ENCOUNTER
Pts needs new rx for albuterol, hers is  at the pharmacy.     Last OV: 17-Yuval    Next OV: 10/25/17-Yam    DX: Asthma     Medications:   Requested Prescriptions     Pending Prescriptions Disp Refills   • albuterol (PROVENTIL) 2.5mg/3ml Nebu Soln solution for nebulization 150 mL 0     Sig: 3 mL by Nebulization route every four hours as needed for Shortness of Breath.

## 2017-10-25 ENCOUNTER — SLEEP CENTER VISIT (OUTPATIENT)
Dept: SLEEP MEDICINE | Facility: MEDICAL CENTER | Age: 52
End: 2017-10-25
Payer: COMMERCIAL

## 2017-10-25 VITALS
OXYGEN SATURATION: 94 % | WEIGHT: 223 LBS | RESPIRATION RATE: 16 BRPM | DIASTOLIC BLOOD PRESSURE: 84 MMHG | SYSTOLIC BLOOD PRESSURE: 112 MMHG | HEART RATE: 82 BPM | HEIGHT: 66 IN | TEMPERATURE: 97.5 F | BODY MASS INDEX: 35.84 KG/M2

## 2017-10-25 DIAGNOSIS — J45.20 MILD INTERMITTENT ASTHMA WITHOUT COMPLICATION: ICD-10-CM

## 2017-10-25 DIAGNOSIS — G47.33 OBSTRUCTIVE SLEEP APNEA: ICD-10-CM

## 2017-10-25 PROCEDURE — 99214 OFFICE O/P EST MOD 30 MIN: CPT | Performed by: NURSE PRACTITIONER

## 2017-10-25 NOTE — PATIENT INSTRUCTIONS
1) Continue autoCPAP at [id number]  2) Clean mask and supplies weekly and change them as insurance allows  3) Continue Qvar 80, Singulair, XyzaL  4) Vaccines: Flu recommended   5) Return in about 3 months (around 1/25/2018) for review of symptoms, if not sooner, Compliance.

## 2017-12-06 ENCOUNTER — OFFICE VISIT (OUTPATIENT)
Dept: MEDICAL GROUP | Facility: MEDICAL CENTER | Age: 52
End: 2017-12-06
Payer: COMMERCIAL

## 2017-12-06 ENCOUNTER — HOSPITAL ENCOUNTER (OUTPATIENT)
Facility: MEDICAL CENTER | Age: 52
End: 2017-12-06
Attending: FAMILY MEDICINE
Payer: COMMERCIAL

## 2017-12-06 VITALS
HEIGHT: 67 IN | SYSTOLIC BLOOD PRESSURE: 118 MMHG | DIASTOLIC BLOOD PRESSURE: 78 MMHG | HEART RATE: 74 BPM | WEIGHT: 228 LBS | TEMPERATURE: 98.5 F | BODY MASS INDEX: 35.79 KG/M2 | RESPIRATION RATE: 18 BRPM | OXYGEN SATURATION: 94 %

## 2017-12-06 DIAGNOSIS — N36.8 URETHRAL PROLAPSE: ICD-10-CM

## 2017-12-06 DIAGNOSIS — N95.1 MENOPAUSAL HOT FLUSHES: ICD-10-CM

## 2017-12-06 DIAGNOSIS — Z90.710 H/O TOTAL HYSTERECTOMY: ICD-10-CM

## 2017-12-06 DIAGNOSIS — J45.50 SEVERE PERSISTENT ASTHMA WITHOUT COMPLICATION: ICD-10-CM

## 2017-12-06 DIAGNOSIS — E78.00 PURE HYPERCHOLESTEROLEMIA: ICD-10-CM

## 2017-12-06 DIAGNOSIS — Z88.6 HISTORY OF ALLERGY TO ASPIRIN: ICD-10-CM

## 2017-12-06 DIAGNOSIS — N36.8 PAIN IN URETHRAL MEATUS: ICD-10-CM

## 2017-12-06 DIAGNOSIS — I34.1 MITRAL VALVE PROLAPSE: ICD-10-CM

## 2017-12-06 PROBLEM — F52.6: Status: ACTIVE | Noted: 2017-12-06

## 2017-12-06 PROCEDURE — 87591 N.GONORRHOEAE DNA AMP PROB: CPT

## 2017-12-06 PROCEDURE — 87491 CHLMYD TRACH DNA AMP PROBE: CPT

## 2017-12-06 PROCEDURE — 99205 OFFICE O/P NEW HI 60 MIN: CPT | Performed by: FAMILY MEDICINE

## 2017-12-06 PROCEDURE — 99000 SPECIMEN HANDLING OFFICE-LAB: CPT | Performed by: FAMILY MEDICINE

## 2017-12-06 RX ORDER — ESTRADIOL 0.5 MG/1
0.5 TABLET ORAL DAILY
Qty: 90 TAB | Refills: 3 | Status: SHIPPED | OUTPATIENT
Start: 2017-12-06 | End: 2018-03-07

## 2017-12-06 RX ORDER — ESTRADIOL 0.1 MG/G
CREAM VAGINAL
Qty: 1 TUBE | Refills: 11 | Status: SHIPPED | OUTPATIENT
Start: 2017-12-06

## 2017-12-06 NOTE — LETTER
[facility]  Mary Barrera M.D.  [address]  [place] [address]  Fax: [phone]   Authorization for Release/Disclosure of   Protected Health Information   Name: VAUGHN CASE : 1965 SSN: [id number]   Address:   [address] [place] [address]  [place] [address] Phone:    [phone] (home)    I authorize the entity listed below to release/disclose the PHI below to:   [facility]/Mary Barrera M.D. and Mary Barrera M.D.   Provider or Entity Name:   [facility]   Address:    Phone:[phone]    Fax:[phone]   Reason for request: continuity of care   Information to be released:    [  ] LAST COLONOSCOPY,  including any PATH REPORT and follow-up  [  ] LAST FIT/COLOGUARD RESULT [  ] LAST DEXA  [  ] LAST MAMMOGRAM  [  ] LAST PAP  [  ] LAST LABS [  ] RETINA EXAM REPORT  [  ] IMMUNIZATION RECORDS  [  ] Release all info      [  ] Check here and initial the line next to each item to release ALL health information INCLUDING  _____ Care and treatment for drug and / or alcohol abuse  _____ HIV testing, infection status, or AIDS  _____ Genetic Testing    DATES OF SERVICE OR TIME PERIOD TO BE DISCLOSED: _____________  I understand and acknowledge that:  * This Authorization may be revoked at any time by you in writing, except if your health information has already been used or disclosed.  * Your health information that will be used or disclosed as a result of you signing this authorization could be re-disclosed by the recipient. If this occurs, your re-disclosed health information may no longer be protected by State or Federal laws.  * You may refuse to sign this Authorization. Your refusal will not affect your ability to obtain treatment.  * This Authorization becomes effective upon signing and will  on (date) __________.      If no date is indicated, this Authorization will  one (1) year from the signature date.    Name: Vaughn Case    Signature:   Date:     2017       PLEASE  FAX REQUESTED RECORDS BACK TO: [phone]

## 2017-12-07 LAB
C TRACH DNA SPEC QL NAA+PROBE: NEGATIVE
N GONORRHOEA DNA SPEC QL NAA+PROBE: NEGATIVE
SPECIMEN SOURCE: NORMAL

## 2017-12-07 NOTE — PROGRESS NOTES
This medical record contains text that has been entered with the assistance of computer voice recognition and dictation software.  Therefore, it may contain unintended errors in text, spelling, punctuation, or grammar        Chief Complaint   Patient presents with   • Establish Care     Discuss GYN issues       Linda Garcia is a 52 y.o. female here evaluation and management of: gyn questions, urethral vs clitoral pain, asthma, cardiac condition, cholesterol, HRT questions     HPI:   retired   her  too, and he retired 1 year ago so they spent the majority of the year traveling through the [place] on motorcycle.  they have now been to all [address] [place] [address]  2 kids, daughter just got  (did Law school in [place] but moved back) she is going to retake bar in   son just proposed he is 23 and also just bought a house!    She is a 53 yo F with mitral valve prolapse and 2/2 SVT well controlled on Bb, HL well controlled, non smoker, asthma , h/o hysterectomy for fibroids  F   Suffering from hot flashes and a new urethral vs clitoral pain x 4 mo   Apparently she saw urologist for this too          Current Outpatient Prescriptions   Medication Sig Dispense Refill   • estradiol (ESTRACE) 0.5 MG tablet Take 1 Tab by mouth every day. 90 Tab 3   • estradiol (ESTRACE) 0.1 MG/GM vaginal cream Apply pea sized opening to urethral opening and vaginal opening 3 nights per week 1 Tube 11   • SINGULAIR 10 MG Tab Take 1 Tab by mouth every day. 30 Tab 11   • beclomethasone (QVAR) 40 MCG/ACT inhaler Inhale 1 Puff by mouth 2 Times a Day.     • XYZAL 5 MG Tab TAKE 1 TABLET BY MOUTH EVERY DAY AS NEEDED 90 Tab 0   • VENTOLIN  (90 BASE) MCG/ACT Aero Soln inhalation aerosol INHALE 2 PUFFS BY MOUTH EVERY 4 HOURS AS NEEDED SHORTNESS OF BREATH 3 Inhaler 0   • simvastatin (ZOCOR) 5 MG Tab Take 10 mg by mouth every evening.     • albuterol (PROVENTIL) 2.5mg/3ml Nebu Soln solution for nebulization 3 mL by Nebulization route  every four hours as needed for Shortness of Breath. 150 mL 0   • SINGULAIR 10 MG Tab Take 1 Tab by mouth every day. 90 Tab 3   • VENTOLIN  (90 BASE) MCG/ACT Aero Soln inhalation aerosol INHALE 2 PUFFS BY MOUTH EVERY 4 HOURS AS NEEDED FOR SHORTNESS OF BREATH 1 Inhaler 5   • beclomethasone (QVAR) 80 MCG/ACT inhaler Inhale 2 Puffs by mouth 2 times a day. Use spacer. Rinse mouth after each use. 1 Inhaler 6   • Beclomethasone Dipropionate (QVAR INH) Inhale  by mouth.     • metoprolol (LOPRESSOR) 25 MG TABS Take 1 Tab by mouth 4 times a day as needed (palpitations). 360 Tab 1   • albuterol (VENTOLIN OR PROVENTIL) 108 (90 BASE) MCG/ACT AERS Inhale 2 Puffs by mouth every 6 hours as needed.       No current facility-administered medications for this visit.      Patient Active Problem List    Diagnosis Date Noted   • H/O total hysterectomy 12/06/2017   • Pain in urethral meatus 12/06/2017   • Mitral valve prolapse 12/06/2017   • Menopausal hot flushes 12/06/2017   • Pure hypercholesterolemia 12/06/2017   • Obstructive sleep apnea 08/23/2017   • Mild intermittent asthma without complication 08/23/2017   • Non-seasonal allergic rhinitis 08/23/2017   • BMI 35.0-35.9,adult 08/23/2017   • Palpitations 11/04/2013   • DUB (dysfunctional uterine bleeding), s/p YANDY 2009 04/05/2013   • ASTHMA 04/05/2013     Past Surgical History:   Procedure Laterality Date   • VAGINAL HYSTERECTOMY SCOPE TOTAL  2/5/2009    Performed by GHULAM DAVILA at SURGERY SAME DAY [facility] ORS   • CYSTOSCOPY  2/5/2009    Performed by GHULAM DAVILA at SURGERY SAME DAY [facility] ORS   • BREAST BIOPSY  1996   • ARTHROSCOPY, KNEE        Social History   Substance Use Topics   • Smoking status: Passive Smoke Exposure - Never Smoker   • Smokeless tobacco: Never Used   • Alcohol use Yes      Comment: mildly     Family History   Problem Relation Age of Onset   • Heart Disease Father    • Cancer Father    • Lung Cancer Mother    • Cancer Sister      Older  "syster HX: Lymphoma           ROS    See HPI  All other systems reviewed and are negative     Objective:     Blood pressure 118/78, pulse 74, temperature 36.9 °C (98.5 °F), resp. rate 18, height 1.702 m (5' 7\"), weight 103.4 kg (228 lb), SpO2 94 %. Body mass index is 35.71 kg/m².  Physical Exam:        GEN: comfortable, alert and oriented, well nourished, well developed, in no apparent distress   HEENT: NCAT, eyes: pupils equal and reactive, sclera white, EOMIT, good dentition  HEART: limbs warm and well perfused, regular rate, no JVD, no lower extremity edema  LUNGS: speaking in full sentences, not in apparent respiratory distress, no audible wheezes  MSK: normal tone and bulk, no swelling of the joints, gait steady and normal   PELVIC: bimanual pelvic examination performed. External female genitalia nl but with mild atrophy mild urethral prolapse, the area of discomfort is located at her urethra. No lesions or discomfort of the clitorus.  No lesions.   Uterus abscent.   No adnexal masses.             Assessment and Plan:   The following treatment plan was discussed        Problem List Items Addressed This Visit     H/O total hysterectomy    Pain in urethral meatus     Pain of the urethral area x 4 months  She is sexually active with her  and she states that he has no issue with Ed, the pain is actually directly correlated with masturbation and cunnilingus with her  only rather than with penetration  She has mild urethral prolapse and also has atrophic vaginitis mild  I suggest she start topical estrogen and oil based lubricant, follow up 3 mo   Will also test for GCCT   Follow up sooner if new symptoms develop or if worsening  Apparently she saw urologist as well whom did not note any pathology        Over 60 minutes spent with patient face to face, greater than 50% time spent with plan/cordination of care as above in my A/P.                 Mitral valve prolapse     And per pt report has 2/2 SVT " episodic which has been well controlled lately with BB  She has a cardiologist                        Menopausal hot flushes     Had hysterectomy in 2009 but hot flashes started only recently  She is interested in HRT  Risk/benefit side effect discussed including but not limited to the nurses study, WHI/HERS and the WHI reevaluation/Turkish study.  She does not have uncontrolled HTN and she does not have uncontrolled dyslipidemia .  She does not smoke  She would like to proceed with HRT, follow up 3 mo for reevaluation                        Relevant Medications    estradiol (ESTRACE) 0.5 MG tablet    estradiol (ESTRACE) 0.1 MG/GM vaginal cream    Other Relevant Orders    CHLAMYDIA/GC PCR URINE OR SWAB    Pure hypercholesterolemia     (states she recently had cholesterol done and total was <190 but is taking statin due to ratio of LDL/HDL)              Other Visit Diagnoses     Severe persistent asthma without complication        History of allergy to aspirin        Urethral prolapse        Relevant Medications    estradiol (ESTRACE) 0.5 MG tablet    estradiol (ESTRACE) 0.1 MG/GM vaginal cream    Other Relevant Orders    CHLAMYDIA/GC PCR URINE OR SWAB                Instructed to follow up if symptoms worsen or fail to improve, ER/UC precautions discussed as well    Mary Barrera MD  [facility], Family Medicine   [address] [place] [address] [address]   [place] [id number]  Phone: [phone]

## 2017-12-07 NOTE — ASSESSMENT & PLAN NOTE
Pain of the urethral area x 4 months  She is sexually active with her  and she states that he has no issue with Ed, the pain is actually directly correlated with masturbation and cunnilingus with her  only rather than with penetration  She has mild urethral prolapse and also has atrophic vaginitis mild  I suggest she start topical estrogen and oil based lubricant, follow up 3 mo   Will also test for GCCT   Follow up sooner if new symptoms develop or if worsening  Apparently she saw urologist as well whom did not note any pathology        Over 60 minutes spent with patient face to face, greater than 50% time spent with plan/cordination of care as above in my A/P.

## 2017-12-07 NOTE — ASSESSMENT & PLAN NOTE
And per pt report has 2/2 SVT episodic which has been well controlled lately with BB  She has a cardiologist

## 2017-12-07 NOTE — ASSESSMENT & PLAN NOTE
(states she recently had cholesterol done and total was <190 but is taking statin due to ratio of LDL/HDL)

## 2017-12-07 NOTE — ASSESSMENT & PLAN NOTE
Had hysterectomy in 2009 but hot flashes started only recently  She is interested in HRT  Risk/benefit side effect discussed including but not limited to the nurses study, WHI/HERS and the WHI reevaluation/Faroese study.  She does not have uncontrolled HTN and she does not have uncontrolled dyslipidemia .  She does not smoke  She would like to proceed with HRT, follow up 3 mo for reevaluation

## 2017-12-11 ENCOUNTER — APPOINTMENT (RX ONLY)
Dept: URBAN - METROPOLITAN AREA CLINIC 4 | Facility: CLINIC | Age: 52
Setting detail: DERMATOLOGY
End: 2017-12-11

## 2017-12-11 ENCOUNTER — APPOINTMENT (RX ONLY)
Dept: URBAN - METROPOLITAN AREA CLINIC 36 | Facility: CLINIC | Age: 52
Setting detail: DERMATOLOGY
End: 2017-12-11

## 2017-12-11 DIAGNOSIS — L30.8 OTHER SPECIFIED DERMATITIS: ICD-10-CM

## 2017-12-11 DIAGNOSIS — D22 MELANOCYTIC NEVI: ICD-10-CM

## 2017-12-11 DIAGNOSIS — L82.0 INFLAMED SEBORRHEIC KERATOSIS: ICD-10-CM

## 2017-12-11 DIAGNOSIS — L82.1 OTHER SEBORRHEIC KERATOSIS: ICD-10-CM

## 2017-12-11 DIAGNOSIS — D18.0 HEMANGIOMA: ICD-10-CM

## 2017-12-11 DIAGNOSIS — Z41.9 ENCOUNTER FOR PROCEDURE FOR PURPOSES OTHER THAN REMEDYING HEALTH STATE, UNSPECIFIED: ICD-10-CM

## 2017-12-11 DIAGNOSIS — L91.8 OTHER HYPERTROPHIC DISORDERS OF THE SKIN: ICD-10-CM

## 2017-12-11 DIAGNOSIS — L81.4 OTHER MELANIN HYPERPIGMENTATION: ICD-10-CM

## 2017-12-11 PROBLEM — D22.71 MELANOCYTIC NEVI OF RIGHT LOWER LIMB, INCLUDING HIP: Status: ACTIVE | Noted: 2017-12-11

## 2017-12-11 PROBLEM — D22.62 MELANOCYTIC NEVI OF LEFT UPPER LIMB, INCLUDING SHOULDER: Status: ACTIVE | Noted: 2017-12-11

## 2017-12-11 PROBLEM — D22.5 MELANOCYTIC NEVI OF TRUNK: Status: ACTIVE | Noted: 2017-12-11

## 2017-12-11 PROBLEM — D18.01 HEMANGIOMA OF SKIN AND SUBCUTANEOUS TISSUE: Status: ACTIVE | Noted: 2017-12-11

## 2017-12-11 PROBLEM — D22.72 MELANOCYTIC NEVI OF LEFT LOWER LIMB, INCLUDING HIP: Status: ACTIVE | Noted: 2017-12-11

## 2017-12-11 PROBLEM — D22.61 MELANOCYTIC NEVI OF RIGHT UPPER LIMB, INCLUDING SHOULDER: Status: ACTIVE | Noted: 2017-12-11

## 2017-12-11 PROCEDURE — 99213 OFFICE O/P EST LOW 20 MIN: CPT | Mod: 25

## 2017-12-11 PROCEDURE — ? SUNSCREEN RECOMMENDATIONS

## 2017-12-11 PROCEDURE — ? COUNSELING

## 2017-12-11 PROCEDURE — 11200 RMVL SKIN TAGS UP TO&INC 15: CPT | Mod: 59

## 2017-12-11 PROCEDURE — ? LIQUID NITROGEN

## 2017-12-11 PROCEDURE — ? BOTOX

## 2017-12-11 PROCEDURE — ? PRESCRIPTION

## 2017-12-11 PROCEDURE — ? SKIN TAG REMOVAL MULTI

## 2017-12-11 PROCEDURE — 17110 DESTRUCTION B9 LES UP TO 14: CPT

## 2017-12-11 RX ORDER — TRIAMCINOLONE ACETONIDE 1 MG/G
1 OINTMENT TOPICAL BID
Qty: 1 | Refills: 2 | Status: ERX | COMMUNITY
Start: 2017-12-11

## 2017-12-11 RX ADMIN — TRIAMCINOLONE ACETONIDE 1: 1 OINTMENT TOPICAL at 00:00

## 2017-12-11 ASSESSMENT — LOCATION DETAILED DESCRIPTION DERM
LOCATION DETAILED: LEFT ANTERIOR AXILLA
LOCATION DETAILED: LEFT ANTERIOR SHOULDER
LOCATION DETAILED: LEFT SUPERIOR MEDIAL UPPER BACK
LOCATION DETAILED: RIGHT ANTERIOR PROXIMAL THIGH
LOCATION DETAILED: SUBXIPHOID
LOCATION DETAILED: RIGHT RADIAL DORSAL HAND
LOCATION DETAILED: RIGHT CENTRAL MALAR CHEEK
LOCATION DETAILED: PERIUMBILICAL SKIN
LOCATION DETAILED: RIGHT RIB CAGE
LOCATION DETAILED: LEFT CENTRAL MALAR CHEEK
LOCATION DETAILED: RIGHT PROXIMAL DORSAL FOREARM
LOCATION DETAILED: LEFT ANTERIOR PROXIMAL THIGH
LOCATION DETAILED: RIGHT SUPERIOR MEDIAL MIDBACK
LOCATION DETAILED: LEFT PROXIMAL POSTERIOR UPPER ARM
LOCATION DETAILED: LEFT PROXIMAL DORSAL FOREARM
LOCATION DETAILED: RIGHT DISTAL POSTERIOR UPPER ARM
LOCATION DETAILED: RIGHT ANTERIOR SHOULDER
LOCATION DETAILED: RIGHT SUPERIOR LATERAL LOWER BACK
LOCATION DETAILED: LEFT INFERIOR ANTERIOR NECK
LOCATION DETAILED: LEFT POSTERIOR AXILLA
LOCATION DETAILED: LEFT RIB CAGE
LOCATION DETAILED: EPIGASTRIC SKIN
LOCATION DETAILED: LEFT ULNAR DORSAL HAND
LOCATION DETAILED: INFERIOR THORACIC SPINE
LOCATION DETAILED: LEFT RADIAL DORSAL HAND
LOCATION DETAILED: SUPERIOR THORACIC SPINE

## 2017-12-11 ASSESSMENT — LOCATION SIMPLE DESCRIPTION DERM
LOCATION SIMPLE: ABDOMEN
LOCATION SIMPLE: LEFT POSTERIOR UPPER ARM
LOCATION SIMPLE: LEFT SHOULDER
LOCATION SIMPLE: RIGHT SHOULDER
LOCATION SIMPLE: RIGHT FOREARM
LOCATION SIMPLE: LEFT ANTERIOR NECK
LOCATION SIMPLE: LEFT FOREARM
LOCATION SIMPLE: LEFT POSTERIOR AXILLA
LOCATION SIMPLE: LEFT THIGH
LOCATION SIMPLE: LEFT ANTERIOR AXILLA
LOCATION SIMPLE: RIGHT CHEEK
LOCATION SIMPLE: LEFT UPPER BACK
LOCATION SIMPLE: RIGHT HAND
LOCATION SIMPLE: LEFT HAND
LOCATION SIMPLE: RIGHT LOWER BACK
LOCATION SIMPLE: RIGHT THIGH
LOCATION SIMPLE: LEFT CHEEK
LOCATION SIMPLE: RIGHT POSTERIOR UPPER ARM
LOCATION SIMPLE: UPPER BACK

## 2017-12-11 ASSESSMENT — LOCATION ZONE DERM
LOCATION ZONE: HAND
LOCATION ZONE: NECK
LOCATION ZONE: TRUNK
LOCATION ZONE: AXILLAE
LOCATION ZONE: LEG
LOCATION ZONE: FACE
LOCATION ZONE: ARM

## 2017-12-11 NOTE — HPI: FULL BODY SKIN EXAMINATION
How Severe Are Your Spot(S)?: mild
What Is The Reason For Today's Visit?: Full Body Skin Examination
What Is The Reason For Today's Visit? (Being Monitored For X): the risk of recurrence of previously treated lesion(s)
Additional History: Lesion on left ring finger for 2-3 months, spreading to middle finger. FBE.

## 2017-12-11 NOTE — PROCEDURE: BOTOX
Lot #: [id number]
Periorbital Skin Units: 0
Post-Care Instructions: Patient instructed to not lie down for 4 hours and limit physical activity for 24 hours. Patient instructed not to travel by airplane for 48 hours.
Consent: Written consent obtained. Risks include but not limited to lid/brow ptosis, bruising, swelling, diplopia, temporary effect, incomplete chemical denervation.
Expiration Date (Month Year): feb 2020
Forehead Units: 5
Glabellar Complex Units: 15
Detail Level: Detailed
Price (Use Numbers Only, No Special Characters Or $): 784
Dilution (U/0.1 Cc): 4

## 2017-12-11 NOTE — PROCEDURE: SKIN TAG REMOVAL MULTI
Consent: Written consent obtained and the risks of skin tag removal was reviewed with the patient including but not limited to bleeding, pigmentary change, infection, pain, and remote possibility of scarring.
Medical Necessity Clause: This procedure was medically necessary because the lesions that were treated were:
Anesthesia Volume In Cc: 3
Anesthesia Type: 1% lidocaine with epinephrine
Include Z78.9 (Other Specified Conditions Influencing Health Status) As An Associated Diagnosis?: No
Detail Level: Detailed
Total Number Of Lesions Treated: 1
Medical Necessity Information: It is in your best interest to select a reason for this procedure from the list below. All of these items fulfill various CMS LCD requirements except the new and changing color options.

## 2017-12-21 ENCOUNTER — APPOINTMENT (RX ONLY)
Dept: URBAN - METROPOLITAN AREA CLINIC 36 | Facility: CLINIC | Age: 52
Setting detail: DERMATOLOGY
End: 2017-12-21

## 2017-12-21 DIAGNOSIS — Z41.9 ENCOUNTER FOR PROCEDURE FOR PURPOSES OTHER THAN REMEDYING HEALTH STATE, UNSPECIFIED: ICD-10-CM

## 2017-12-21 PROCEDURE — ? COSMETIC FOLLOW-UP

## 2017-12-21 PROCEDURE — ? ADDITIONAL NOTES

## 2017-12-21 PROCEDURE — ? MEDICATION COUNSELING

## 2017-12-21 PROCEDURE — ? PRESCRIPTION

## 2017-12-21 RX ORDER — APRACLONIDINE 5.75 MG/ML
SOLUTION OPHTHALMIC
Qty: 1 | Refills: 3 | Status: ERX | COMMUNITY
Start: 2017-12-21

## 2017-12-21 RX ADMIN — APRACLONIDINE: 5.75 SOLUTION OPHTHALMIC at 00:00

## 2017-12-21 NOTE — PROCEDURE: COSMETIC FOLLOW-UP
Side Effects Override (Free Text): eyelid droop
Patient Satisfaction: Pleased
Treatment (Optional): Botox
Detail Level: Zone

## 2017-12-21 NOTE — PROCEDURE: ADDITIONAL NOTES
Additional Notes: Pt states that her previous DrMeenu mentioned that her sinus cavities are near the surface and Botox might affect eye droop

## 2018-01-21 DIAGNOSIS — J45.909 UNCOMPLICATED ASTHMA: ICD-10-CM

## 2018-01-22 NOTE — TELEPHONE ENCOUNTER
Have we ever prescribed this med? Yes.  If yes, what date? 04/25/2017    Last OV: 10/25/2017 - Pina Byrne    Next OV: 05/17/2018 - Pina Byrne    DX: Uncomplicated Asthma    Medications: Singulair

## 2018-03-07 ENCOUNTER — OFFICE VISIT (OUTPATIENT)
Dept: MEDICAL GROUP | Facility: MEDICAL CENTER | Age: 53
End: 2018-03-07
Payer: COMMERCIAL

## 2018-03-07 VITALS
SYSTOLIC BLOOD PRESSURE: 112 MMHG | DIASTOLIC BLOOD PRESSURE: 74 MMHG | BODY MASS INDEX: 34.21 KG/M2 | RESPIRATION RATE: 18 BRPM | WEIGHT: 218 LBS | HEART RATE: 71 BPM | OXYGEN SATURATION: 97 % | TEMPERATURE: 97.3 F | HEIGHT: 67 IN

## 2018-03-07 DIAGNOSIS — G44.86 CERVICOGENIC HEADACHE: ICD-10-CM

## 2018-03-07 DIAGNOSIS — E55.9 VITAMIN D DEFICIENCY: ICD-10-CM

## 2018-03-07 DIAGNOSIS — E78.00 PURE HYPERCHOLESTEROLEMIA: ICD-10-CM

## 2018-03-07 DIAGNOSIS — N36.8 PAIN IN URETHRAL MEATUS: ICD-10-CM

## 2018-03-07 DIAGNOSIS — S16.1XXA STRAIN OF NECK MUSCLE, INITIAL ENCOUNTER: ICD-10-CM

## 2018-03-07 DIAGNOSIS — N95.1 MENOPAUSAL HOT FLUSHES: ICD-10-CM

## 2018-03-07 DIAGNOSIS — Z71.3 ENCOUNTER FOR WEIGHT LOSS COUNSELING: ICD-10-CM

## 2018-03-07 PROCEDURE — 99214 OFFICE O/P EST MOD 30 MIN: CPT | Performed by: FAMILY MEDICINE

## 2018-03-07 RX ORDER — CHOLECALCIFEROL (VITAMIN D3) 50 MCG
2000 TABLET ORAL DAILY
Qty: 90 TAB | Refills: 3 | Status: SHIPPED | OUTPATIENT
Start: 2018-03-07

## 2018-03-07 ASSESSMENT — PATIENT HEALTH QUESTIONNAIRE - PHQ9: CLINICAL INTERPRETATION OF PHQ2 SCORE: 0

## 2018-03-07 NOTE — ASSESSMENT & PLAN NOTE
Lab Results  Component Value Date/Time   CHOLSTRLTOT 192 10/06/2017 0912   TRIGLYCERIDE 170 (H) 10/06/2017 0912   HDL 64 10/06/2017 0912   LDL 94 10/06/2017 0912     She can recheck in 6 mo -1 year

## 2018-03-07 NOTE — PROGRESS NOTES
This medical record contains text that has been entered with the assistance of computer voice recognition and dictation software.  Therefore, it may contain unintended errors in text, spelling, punctuation, or grammar        Chief Complaint   Patient presents with   • Follow-Up   • Hyperlipidemia       Linda Garcia is a 52 y.o. female here evaluation and management of: gyn questions, urethral vs clitoral pain, asthma, cardiac condition, cholesterol, HRT questions, neck strain and headaches , and wt loss     HPI:   retired   her  too, and he retired 1 year ago so they spent the majority of the year traveling through the [place] on motorcycle.  they have now been to all 50 states  2 kids, daughter just got  (did Law school in [place] but moved back) she is going to retake bar in   son just proposed he is 23 and also just bought a house!    She is a 51 yo F with mitral valve prolapse and 2/2 SVT well controlled on BB, HL well controlled, non smoker, asthma , h/o hysterectomy for fibroids  F   Suffering from hot flashes   And we have been seeing her new urethral vs clitoral pain x 4 mo , I suggested trial of topical estrogen  She states that this helped but she had trouble with compliance   Apparently she saw urologist for this too  She would like to see gyn now      Neck strain  Not sure what from   tighness in the AM will improve with stretches or throughout the day, sometimes with headache    10 lb wt loss with wt watchers    Low vit d, has questions    Hyperlipidemia taking statin         Current Outpatient Prescriptions   Medication Sig Dispense Refill   • Cholecalciferol (VITAMIN D) 2000 UNIT Tab Take 1 Tab by mouth every day. 90 Tab 3   • SINGULAIR 10 MG Tab Take 1 Tab by mouth every day. 30 Tab 11   • XYZAL 5 MG Tab TAKE 1 TABLET BY MOUTH EVERY DAY AS NEEDED 90 Tab 0   • simvastatin (ZOCOR) 5 MG Tab Take 10 mg by mouth every evening.     • estradiol (ESTRACE) 0.1 MG/GM vaginal cream  Apply pea sized opening to urethral opening and vaginal opening 3 nights per week 1 Tube 11   • albuterol (PROVENTIL) 2.5mg/3ml Nebu Soln solution for nebulization 3 mL by Nebulization route every four hours as needed for Shortness of Breath. 150 mL 0   • beclomethasone (QVAR) 80 MCG/ACT inhaler Inhale 2 Puffs by mouth 2 times a day. Use spacer. Rinse mouth after each use. 1 Inhaler 6   • VENTOLIN  (90 BASE) MCG/ACT Aero Soln inhalation aerosol INHALE 2 PUFFS BY MOUTH EVERY 4 HOURS AS NEEDED SHORTNESS OF BREATH 3 Inhaler 0   • metoprolol (LOPRESSOR) 25 MG TABS Take 1 Tab by mouth 4 times a day as needed (palpitations). 360 Tab 1     No current facility-administered medications for this visit.      Patient Active Problem List    Diagnosis Date Noted   • Neck strain 03/07/2018   • Encounter for weight loss counseling 03/07/2018   • Vitamin D deficiency 03/07/2018   • H/O total hysterectomy 12/06/2017   • Pain in urethral meatus 12/06/2017   • Mitral valve prolapse 12/06/2017   • Menopausal hot flushes 12/06/2017   • Pure hypercholesterolemia 12/06/2017   • Obstructive sleep apnea 08/23/2017   • Mild intermittent asthma without complication 08/23/2017   • Non-seasonal allergic rhinitis 08/23/2017   • BMI 35.0-35.9,adult 08/23/2017   • Palpitations 11/04/2013   • DUB (dysfunctional uterine bleeding), s/p YANDY 2009 04/05/2013   • ASTHMA 04/05/2013     Past Surgical History:   Procedure Laterality Date   • VAGINAL HYSTERECTOMY SCOPE TOTAL  2/5/2009    Performed by GHULAM DAIVLA at SURGERY [facility]   • CYSTOSCOPY  2/5/2009    Performed by GHULAM DAVILA at [facility]ERY [facility]   • BREAST BIOPSY  1996   • ARTHROSCOPY, KNEE        Social History   Substance Use Topics   • Smoking status: Passive Smoke Exposure - Never Smoker   • Smokeless tobacco: Never Used   • Alcohol use Yes      Comment: mildly     Family History   Problem Relation Age of Onset   • Heart Disease Father    • Cancer  "Father    • Lung Cancer Mother    • Cancer Sister      Older syster HX: Lymphoma           ROS    See HPI  All other systems reviewed and are negative     Objective:     Blood pressure 112/74, pulse 71, temperature 36.3 °C (97.3 °F), resp. rate 18, height 1.702 m (5' 7\"), weight 98.9 kg (218 lb), SpO2 97 %. Body mass index is 34.14 kg/m².  Physical Exam:        GEN: comfortable, alert and oriented, well nourished, well developed, in no apparent distress   HEENT: NCAT, eyes: pupils equal and reactive, sclera white, EOMIT, good dentition  HEART: limbs warm and well perfused, regular rate, no JVD, no lower extremity edema  LUNGS: speaking in full sentences, not in apparent respiratory distress, no audible wheezes  MSK: normal tone and bulk, no swelling of the joints, gait steady and normal  - no midline neck pain + spasm in paraspinal muscles  Neuro:   CN 2-12 ITBL, no focal facial asymmetries, strength 5/5 in all four extremities, nl and symmetric reflex all four extremities, sensation grossly intact throughout.  Cerebellar testing wnl.  Gait steady and normal.      PELVIC: deferred              Assessment and Plan:   The following treatment plan was discussed        Problem List Items Addressed This Visit     Pain in urethral meatus     Pain of the urethral area x 4 months  She is sexually active with her  and she states that he has no issue with ED, the pain is actually directly correlated with masturbation and cunnilingus with her  only rather than with penetration  She has mild urethral prolapse and also has atrophic vaginitis mild  I suggest she continue topical estrogen and oil based lubricant  I also suggest activity modification and can apply ice post activity   We already  tested for GCCT   Follow up sooner if new symptoms develop or if worsening  Apparently she saw urologist as well whom did not note any pathology  She would like to see gyn as well                          Relevant Orders    " REFERRAL TO GYNECOLOGY    Menopausal hot flushes     Stopped oral HRT  Would like to continue on topical vaginal estrogen             Pure hypercholesterolemia       Lab Results  Component Value Date/Time   CHOLSTRLTOT 192 10/06/2017 0912   TRIGLYCERIDE 170 (H) 10/06/2017 0912   HDL 64 10/06/2017 0912   LDL 94 10/06/2017 0912     She can recheck in 6 mo -1 year                 Neck strain     Stretches heat activity mod  Follow up if symptoms worsen or fail to improve             Encounter for weight loss counseling    Vitamin D deficiency     Take 2000IU indefinitely '             Relevant Medications    Cholecalciferol (VITAMIN D) 2000 UNIT Tab    Other Relevant Orders    VITAMIN D,25 HYDROXY      Other Visit Diagnoses     Cervicogenic headache                    Instructed to follow up if symptoms worsen or fail to improve, ER/UC precautions discussed as well    Mary Barrera MD  [facility], Family Medicine   [address] [place] [address]   [place] [address]  Phone: [phone]

## 2018-03-07 NOTE — ASSESSMENT & PLAN NOTE
Pain of the urethral area x 4 months  She is sexually active with her  and she states that he has no issue with ED, the pain is actually directly correlated with masturbation and cunnilingus with her  only rather than with penetration  She has mild urethral prolapse and also has atrophic vaginitis mild  I suggest she continue topical estrogen and oil based lubricant  I also suggest activity modification and can apply ice post activity   We already  tested for GCCT   Follow up sooner if new symptoms develop or if worsening  Apparently she saw urologist as well whom did not note any pathology  She would like to see gyn as well

## 2018-03-09 ENCOUNTER — PATIENT OUTREACH (OUTPATIENT)
Dept: HEALTH INFORMATION MANAGEMENT | Facility: OTHER | Age: 53
End: 2018-03-09

## 2018-03-09 NOTE — PROGRESS NOTES
Outcome: Left Message -pt didnt want to give  - this call was to go over health maintenance topics     Please transfer to Patient Outreach Team at [phone] when patient returns call.    WebIZ Checked & Epic Updated:  no    HealthConnect Verified: no    Attempt # 1

## 2018-03-09 NOTE — PROGRESS NOTES
Attempt #:1    WebIZ Checked & Epic Updated: yes  HealthConnect Verified: yes  Verify PCP: no    Communication Preference Obtained: yes     Review Care Team: yes    PT TRANSFERRED TO [facility] FOR PAP SMEAR/ COLONOSCOPY-MEHDI SENT/ IMMUNIZATIONS-STATES SHE DISCUSSED WITH PCP  MyClakeshat Activation: already active

## 2018-04-04 DIAGNOSIS — R06.02 SOB (SHORTNESS OF BREATH): ICD-10-CM

## 2018-04-04 RX ORDER — ALBUTEROL SULFATE 90 UG/1
AEROSOL, METERED RESPIRATORY (INHALATION)
Qty: 1 INHALER | Refills: 5 | Status: SHIPPED | OUTPATIENT
Start: 2018-04-04

## 2018-04-04 NOTE — TELEPHONE ENCOUNTER
Have we ever prescribed this med? Yes.  If yes, what date? 10/24/16    Last OV: 10/25/17    Next OV: 5/17/18    DX: Uncomplicated asthma, unspecified asthma severity (J45.909)    Medications: VENTOLIN  (90 BASE) MCG/ACT Aero Soln inhalation aerosol

## 2018-04-09 ENCOUNTER — TELEPHONE (OUTPATIENT)
Dept: MEDICAL GROUP | Facility: MEDICAL CENTER | Age: 53
End: 2018-04-09

## 2018-04-09 DIAGNOSIS — Z12.83 SKIN CANCER SCREENING: ICD-10-CM

## 2018-04-09 NOTE — TELEPHONE ENCOUNTER
Pt with an dm to see Dr. Chinyere Jordan 5/18/2018 for a annual screening Skin CA dm. Hoped to get a referral without being seen first since she'd been seen this provider for many years.    I pended the referral however I informed pt that she may need to be seen in order to be evaluated and properly diagnose the visit.     Dr. Barrera please advise...

## 2018-04-25 ENCOUNTER — TELEPHONE (OUTPATIENT)
Dept: PULMONOLOGY | Facility: HOSPICE | Age: 53
End: 2018-04-25

## 2018-04-26 NOTE — TELEPHONE ENCOUNTER
Called the patient to see what she needed a tier exception on and she already called [facility] and got her tier exception on Singulair!  Tier exception approval is scanned.

## 2018-05-02 DIAGNOSIS — G47.30 SLEEP APNEA, UNSPECIFIED TYPE: ICD-10-CM

## 2018-05-17 ENCOUNTER — APPOINTMENT (OUTPATIENT)
Dept: SLEEP MEDICINE | Facility: MEDICAL CENTER | Age: 53
End: 2018-05-17
Payer: COMMERCIAL

## 2018-06-06 ENCOUNTER — HOSPITAL ENCOUNTER (OUTPATIENT)
Dept: LAB | Facility: MEDICAL CENTER | Age: 53
End: 2018-06-06
Attending: FAMILY MEDICINE
Payer: COMMERCIAL

## 2018-06-06 DIAGNOSIS — E55.9 VITAMIN D DEFICIENCY: ICD-10-CM

## 2018-06-06 LAB — 25(OH)D3 SERPL-MCNC: 22 NG/ML (ref 30–100)

## 2018-06-06 PROCEDURE — 82306 VITAMIN D 25 HYDROXY: CPT

## 2018-06-06 PROCEDURE — 36415 COLL VENOUS BLD VENIPUNCTURE: CPT

## 2018-06-15 ENCOUNTER — APPOINTMENT (RX ONLY)
Dept: URBAN - METROPOLITAN AREA CLINIC 4 | Facility: CLINIC | Age: 53
Setting detail: DERMATOLOGY
End: 2018-06-15

## 2018-06-15 ENCOUNTER — PATIENT OUTREACH (OUTPATIENT)
Dept: HEALTH INFORMATION MANAGEMENT | Facility: OTHER | Age: 53
End: 2018-06-15

## 2018-06-15 DIAGNOSIS — L57.0 ACTINIC KERATOSIS: ICD-10-CM

## 2018-06-15 DIAGNOSIS — L82.0 INFLAMED SEBORRHEIC KERATOSIS: ICD-10-CM

## 2018-06-15 DIAGNOSIS — D22 MELANOCYTIC NEVI: ICD-10-CM

## 2018-06-15 DIAGNOSIS — L30.8 OTHER SPECIFIED DERMATITIS: ICD-10-CM

## 2018-06-15 PROBLEM — D22.39 MELANOCYTIC NEVI OF OTHER PARTS OF FACE: Status: ACTIVE | Noted: 2018-06-15

## 2018-06-15 PROCEDURE — ? PRESCRIPTION SAMPLES PROVIDED

## 2018-06-15 PROCEDURE — ? PATIENT SPECIFIC COUNSELING

## 2018-06-15 PROCEDURE — ? OBSERVATION

## 2018-06-15 PROCEDURE — 99213 OFFICE O/P EST LOW 20 MIN: CPT | Mod: 25

## 2018-06-15 PROCEDURE — ? LIQUID NITROGEN

## 2018-06-15 PROCEDURE — 17000 DESTRUCT PREMALG LESION: CPT | Mod: 59

## 2018-06-15 PROCEDURE — 17110 DESTRUCTION B9 LES UP TO 14: CPT

## 2018-06-15 ASSESSMENT — LOCATION DETAILED DESCRIPTION DERM
LOCATION DETAILED: RIGHT CENTRAL EYEBROW
LOCATION DETAILED: NASAL DORSUM
LOCATION DETAILED: LEFT PROXIMAL PALMAR RING FINGER
LOCATION DETAILED: LEFT INFERIOR PARIETAL SCALP
LOCATION DETAILED: STERNAL NOTCH
LOCATION DETAILED: LEFT POSTERIOR PARIETAL SCALP

## 2018-06-15 ASSESSMENT — LOCATION ZONE DERM
LOCATION ZONE: FINGER
LOCATION ZONE: TRUNK
LOCATION ZONE: FACE
LOCATION ZONE: NOSE
LOCATION ZONE: SCALP

## 2018-06-15 ASSESSMENT — LOCATION SIMPLE DESCRIPTION DERM
LOCATION SIMPLE: SCALP
LOCATION SIMPLE: CHEST
LOCATION SIMPLE: RIGHT EYEBROW
LOCATION SIMPLE: NOSE
LOCATION SIMPLE: LEFT RING FINGER
LOCATION SIMPLE: POSTERIOR SCALP

## 2018-06-15 NOTE — PROCEDURE: LIQUID NITROGEN
Detail Level: Detailed
Medical Necessity Information: It is in your best interest to select a reason for this procedure from the list below. All of these items fulfill various CMS LCD requirements except the new and changing color options.
Post-Care Instructions: I reviewed with the patient in detail post-care instructions. Patient is to wear sunprotection, and avoid picking at any of the treated lesions. Pt may apply Vaseline to crusted or scabbing areas.
Consent: The patient's consent was obtained including but not limited to risks of crusting, scabbing, blistering, scarring, darker or lighter pigmentary change, recurrence, incomplete removal and infection.
Render Post-Care Instructions In Note?: no
Medical Necessity Clause: This procedure was medically necessary because the lesions that were treated were:
Number Of Freeze-Thaw Cycles: 2 freeze-thaw cycles
Duration Of Freeze Thaw-Cycle (Seconds): 3

## 2018-06-15 NOTE — PROGRESS NOTES
Outcome: Left Message    Please transfer to Patient Outreach Team at [phone] when patient returns call.    WebIZ Checked & Epic Updated:  yes    HealthConnect Verified: yes    Attempt # 1

## 2018-06-15 NOTE — HPI: SKIN LESION
Is This A New Presentation, Or A Follow-Up?: Skin Lesion
Has Your Skin Lesion Been Treated?: not been treated
Is This A New Presentation, Or A Follow-Up?: Follow Up Skin Lesion
Has Your Skin Lesion Been Treated?: been treated
When Was It Treated?: Multiple times

## 2018-06-15 NOTE — PROCEDURE: PATIENT SPECIFIC COUNSELING
Detail Level: Simple
It looks much better than it did. Pt is no longer using TAC, so Am-lactin was given today and can be used.

## 2018-06-25 ENCOUNTER — TELEPHONE (OUTPATIENT)
Dept: MEDICAL GROUP | Facility: MEDICAL CENTER | Age: 53
End: 2018-06-25

## 2018-06-25 NOTE — PROGRESS NOTES
Attempt #:Final  Verify PCP: yes    Communication Preference Obtained: yes       Care Gap Scheduling (Attempt to Schedule EACH Overdue Care Gap!)  Health Maintenance Due   Topic Date Due   • IMM DTaP/Tdap/Td Vaccine (1 - Tdap) 07/29/1985// Declined   • IMM PNEUMOCOCCAL 19-64 (ADULT) MEDIUM RISK SERIES (1 of 1 - PPSV23) 07/29/1985 // Pt stated that already had this vaccine about 3 yrs ago and that is already in her records.   • PAP SMEAR  07/29/1987 // Requested records       MyChart Activation: already active

## 2018-06-25 NOTE — TELEPHONE ENCOUNTER
Good afternoon Mary    Pt stated that recently completed some labs and that her vitamin D is very low. Linda said that currently she is taking 2000 mg of vitamin D and that is over the counter, and that was recommended by Dr. Zuluaga. However she is wondering if you can review her last results and let her know if needs to increase the dosis, or, if she needs to schedule an appt with you. Please advise.    Thank you.

## 2018-06-27 ENCOUNTER — PATIENT MESSAGE (OUTPATIENT)
Dept: HEALTH INFORMATION MANAGEMENT | Facility: OTHER | Age: 53
End: 2018-06-27

## 2018-09-26 ENCOUNTER — OFFICE VISIT (OUTPATIENT)
Dept: MEDICAL GROUP | Facility: MEDICAL CENTER | Age: 53
End: 2018-09-26
Payer: COMMERCIAL

## 2018-09-26 VITALS
RESPIRATION RATE: 20 BRPM | HEART RATE: 76 BPM | HEIGHT: 67 IN | DIASTOLIC BLOOD PRESSURE: 76 MMHG | SYSTOLIC BLOOD PRESSURE: 112 MMHG | BODY MASS INDEX: 33.84 KG/M2 | OXYGEN SATURATION: 95 % | TEMPERATURE: 97.8 F | WEIGHT: 215.6 LBS

## 2018-09-26 DIAGNOSIS — R10.84 GENERALIZED ABDOMINAL PAIN: ICD-10-CM

## 2018-09-26 DIAGNOSIS — R10.9 ABDOMINAL WALL PAIN: ICD-10-CM

## 2018-09-26 PROCEDURE — 99214 OFFICE O/P EST MOD 30 MIN: CPT | Performed by: FAMILY MEDICINE

## 2018-09-26 NOTE — ASSESSMENT & PLAN NOTE
I suspect hernia because she has constipation and pain focally with valsalva    I suggest trial wt loss and core strengthening   She is requesting that we move forward with Cat scan and General surgery consultation rather than watchful waiting     Over 25 minutes spent with patient face to face, greater than 50% time spent with plan/coordination of care regarding that which is discussed in the HPI and A&P

## 2018-09-26 NOTE — PROGRESS NOTES
This medical record contains text that has been entered with the assistance of computer voice recognition and dictation software.  Therefore, it may contain unintended errors in text, spelling, punctuation, or grammar        Chief Complaint   Patient presents with   • GI Problem     lower,R sometimes hurt at night       Linda Garcia is a 52 y.o. female here evaluation and management of: see above     HPI:   retired   her  too, and he retired 1 year ago so they spent the majority of the year traveling through the [place] on motorcycle.  they have now been to all [address] [place] [address]  2 kids, daughter just got , engaged    She is a 52 yo F with mitral valve prolapse and 2/2 SVT well controlled on BB, HL well controlled, non smoker, asthma , h/o hysterectomy for fibroids  F   Suffering from hot flashes   And we have been seeing her new urethral vs clitoral pain x 4 mo , I suggested trial of topical estrogen  She states that this helped but she had trouble with compliance   Apparently she saw urologist for this too  And gynocologist   And no organic problems were identified  She also has seen GI         New issue focal R lower quadrant abd wall pain   With straining at toilet  No fever/chill        Current Outpatient Prescriptions   Medication Sig Dispense Refill   • VENTOLIN  (90 Base) MCG/ACT Aero Soln inhalation aerosol INHALE 2 PUFFS BY MOUTH EVERY 4 HOURS AS NEEDED FOR SHORTNESS OF BREATH 1 Inhaler 5   • Cholecalciferol (VITAMIN D) 2000 UNIT Tab Take 1 Tab by mouth every day. 90 Tab 3   • estradiol (ESTRACE) 0.1 MG/GM vaginal cream Apply pea sized opening to urethral opening and vaginal opening 3 nights per week 1 Tube 11   • albuterol (PROVENTIL) 2.5mg/3ml Nebu Soln solution for nebulization 3 mL by Nebulization route every four hours as needed for Shortness of Breath. 150 mL 0   • SINGULAIR 10 MG Tab Take 1 Tab by mouth every day. 30 Tab 11   • beclomethasone (QVAR) 80 MCG/ACT inhaler  Inhale 2 Puffs by mouth 2 times a day. Use spacer. Rinse mouth after each use. 1 Inhaler 6   • XYZAL 5 MG Tab TAKE 1 TABLET BY MOUTH EVERY DAY AS NEEDED 90 Tab 0   • VENTOLIN  (90 BASE) MCG/ACT Aero Soln inhalation aerosol INHALE 2 PUFFS BY MOUTH EVERY 4 HOURS AS NEEDED SHORTNESS OF BREATH 3 Inhaler 0   • simvastatin (ZOCOR) 5 MG Tab Take 10 mg by mouth every evening.     • metoprolol (LOPRESSOR) 25 MG TABS Take 1 Tab by mouth 4 times a day as needed (palpitations). 360 Tab 1     No current facility-administered medications for this visit.      Patient Active Problem List    Diagnosis Date Noted   • Generalized abdominal pain 09/26/2018   • Abdominal wall pain 09/26/2018   • Neck strain 03/07/2018   • Encounter for weight loss counseling 03/07/2018   • Vitamin D deficiency 03/07/2018   • H/O total hysterectomy 12/06/2017   • Pain in urethral meatus 12/06/2017   • Mitral valve prolapse 12/06/2017   • Menopausal hot flushes 12/06/2017   • Pure hypercholesterolemia 12/06/2017   • Obstructive sleep apnea 08/23/2017   • Mild intermittent asthma without complication 08/23/2017   • Non-seasonal allergic rhinitis 08/23/2017   • BMI 35.0-35.9,adult 08/23/2017   • Palpitations 11/04/2013   • DUB (dysfunctional uterine bleeding), s/p YANDY 2009 04/05/2013   • ASTHMA 04/05/2013     Past Surgical History:   Procedure Laterality Date   • VAGINAL HYSTERECTOMY SCOPE TOTAL  2/5/2009    Performed by GHULAM DAVILA at SURGERY SAME DAY [facility] ORS   • CYSTOSCOPY  2/5/2009    Performed by GHULAM DAVILA at SURGERY SAME DAY [facility] ORS   • BREAST BIOPSY  1996   • ARTHROSCOPY, KNEE        Social History   Substance Use Topics   • Smoking status: Passive Smoke Exposure - Never Smoker   • Smokeless tobacco: Never Used   • Alcohol use Yes      Comment: mildly     Family History   Problem Relation Age of Onset   • Heart Disease Father    • Cancer Father    • Lung Cancer Mother    • Cancer Sister         Older syster HX: Lymphoma  "          ROS    See HPI  All other systems reviewed and are negative     Objective:     Blood pressure 112/76, pulse 76, temperature 36.6 °C (97.8 °F), temperature source Temporal, resp. rate 20, height 1.702 m (5' 7\"), weight 97.8 kg (215 lb 9.6 oz), SpO2 95 %. Body mass index is 33.77 kg/m².  Physical Exam:      Constitutional: Alert, no distress.  Skin: Warm, dry, good turgor, no rashes in visible areas.  Eye: Equal, round and reactive, conjunctiva clear, lids normal.  ENMT: Lips without lesions, good dentition, oropharynx clear.  Neck: Trachea midline, no masses, no thyromegaly. No cervical or supraclavicular lymphadenopathy.  Respiratory: Unlabored respiratory effort, lungs clear to auscultation, no wheezes, no ronchi.  Cardiovascular: Normal S1, S2, no murmur, no edema.  Abdomen: Soft, non-tender, no masses, no hepatosplenomegaly. She points with one finger to the area that bothers her, with valsalva I appreciate a bulge that is self reducing   Psych: Alert and oriented x3, normal affect and mood.                  Assessment and Plan:   The following treatment plan was discussed        Problem List Items Addressed This Visit     Generalized abdominal pain     Has had YANDY             Abdominal wall pain     I suspect hernia because she has constipation and pain focally with valsalva    I suggest trial wt loss and core strengthening   She is requesting that we move forward with Cat scan and General surgery consultation rather than watchful waiting     Over 25 minutes spent with patient face to face, greater than 50% time spent with plan/coordination of care regarding that which is discussed in the HPI and A&P           Relevant Orders    CT-ABDOMEN WITH & W/O, PELVIS WITH    REFERRAL TO GENERAL SURGERY                Instructed to follow up if symptoms worsen or fail to improve, ER/UC precautions discussed as well    Mary Barrera MD  [facility], Family Medicine   [address] [place] [address]   Ancelmo ROSE " [id number]  Phone: [phone]

## 2018-10-05 ENCOUNTER — HOSPITAL ENCOUNTER (OUTPATIENT)
Dept: RADIOLOGY | Facility: MEDICAL CENTER | Age: 53
End: 2018-10-05
Attending: OBSTETRICS & GYNECOLOGY
Payer: COMMERCIAL

## 2018-10-05 ENCOUNTER — HOSPITAL ENCOUNTER (OUTPATIENT)
Dept: RADIOLOGY | Facility: MEDICAL CENTER | Age: 53
End: 2018-10-05
Attending: FAMILY MEDICINE
Payer: COMMERCIAL

## 2018-10-05 DIAGNOSIS — R10.9 ABDOMINAL WALL PAIN: ICD-10-CM

## 2018-10-05 DIAGNOSIS — Z12.31 VISIT FOR SCREENING MAMMOGRAM: ICD-10-CM

## 2018-10-05 PROCEDURE — 74178 CT ABD&PLV WO CNTR FLWD CNTR: CPT

## 2018-10-05 PROCEDURE — 77067 SCR MAMMO BI INCL CAD: CPT

## 2018-10-05 PROCEDURE — 700117 HCHG RX CONTRAST REV CODE 255: Performed by: FAMILY MEDICINE

## 2018-10-05 RX ADMIN — IOHEXOL 100 ML: 350 INJECTION, SOLUTION INTRAVENOUS at 13:30

## 2018-10-16 ENCOUNTER — PATIENT OUTREACH (OUTPATIENT)
Dept: HEALTH INFORMATION MANAGEMENT | Facility: OTHER | Age: 53
End: 2018-10-16

## 2018-11-12 DIAGNOSIS — J45.40 MODERATE PERSISTENT ASTHMA WITHOUT COMPLICATION: ICD-10-CM

## 2018-11-15 ENCOUNTER — OFFICE VISIT (OUTPATIENT)
Dept: PULMONOLOGY | Facility: HOSPICE | Age: 53
End: 2018-11-15
Payer: COMMERCIAL

## 2018-11-15 ENCOUNTER — NON-PROVIDER VISIT (OUTPATIENT)
Dept: PULMONOLOGY | Facility: HOSPICE | Age: 53
End: 2018-11-15
Payer: COMMERCIAL

## 2018-11-15 DIAGNOSIS — J45.30 MILD PERSISTENT ASTHMA WITHOUT COMPLICATION: ICD-10-CM

## 2018-11-15 DIAGNOSIS — G47.33 OBSTRUCTIVE SLEEP APNEA: ICD-10-CM

## 2018-11-15 DIAGNOSIS — J30.89 NON-SEASONAL ALLERGIC RHINITIS, UNSPECIFIED TRIGGER: ICD-10-CM

## 2018-11-15 DIAGNOSIS — J45.40 MODERATE PERSISTENT ASTHMA WITHOUT COMPLICATION: ICD-10-CM

## 2018-11-15 DIAGNOSIS — I34.1 MITRAL VALVE PROLAPSE: ICD-10-CM

## 2018-11-15 PROCEDURE — 94729 DIFFUSING CAPACITY: CPT | Performed by: INTERNAL MEDICINE

## 2018-11-15 PROCEDURE — 94726 PLETHYSMOGRAPHY LUNG VOLUMES: CPT | Performed by: INTERNAL MEDICINE

## 2018-11-15 PROCEDURE — 99213 OFFICE O/P EST LOW 20 MIN: CPT | Performed by: PHYSICIAN ASSISTANT

## 2018-11-15 PROCEDURE — 94060 EVALUATION OF WHEEZING: CPT | Performed by: INTERNAL MEDICINE

## 2018-11-15 RX ORDER — ALBUTEROL SULFATE 90 UG/1
AEROSOL, METERED RESPIRATORY (INHALATION)
Qty: 3 INHALER | Refills: 4 | Status: SHIPPED | OUTPATIENT
Start: 2018-11-15

## 2018-11-15 ASSESSMENT — PULMONARY FUNCTION TESTS
FVC_LLN: 3.12
FVC_PREDICTED: 3.74
FVC_PERCENT_PREDICTED: 93
FEV1_PERCENT_PREDICTED: 80
FEV1/FVC_PERCENT_PREDICTED: 100
FEV1/FVC_PERCENT_CHANGE: 2
FEV1/FVC_PERCENT_PREDICTED: 99
FVC: 3.04
FEV1/FVC_PERCENT_PREDICTED: 97
FEV1/FVC: 81
FEV1_PERCENT_PREDICTED: 95
FEV1/FVC: 80.52
FEV1/FVC_PREDICTED: 80
FEV1/FVC_PERCENT_LLN: 67
FEV1/FVC: 78
FEV1_PERCENT_CHANGE: 15
FEV1: 2.81
FVC: 3.49
FEV1: 2.38
FEV1_PERCENT_CHANGE: 18
FEV1/FVC_PERCENT_PREDICTED: 102
FEV1/FVC_PERCENT_PREDICTED: 79
FEV1_LLN: 2.45
FVC_PERCENT_PREDICTED: 81
FEV1/FVC: 78
FEV1/FVC_PERCENT_CHANGE: 120
FEV1_PREDICTED: 2.94

## 2018-11-15 NOTE — PROCEDURES
Good patient effort & cooperation.  The results of this test meet the ATS/ERS standards for acceptability and repeatability.  Predicted equations for Spirometry are N-Froylan II, ITS for lung volumes, and [facility] for DLCO.  The DLCO was uncorrected for Hgb.  A bronchodilator of Ventolin HFA- 2puffs via spacer were administered.  DLCO was performed during dilation period.    1. Baseline spirometry demonstrates a low normal  FEV1 and FVC. FEV1/FVC ratio is normal.    2. After administration of an inhaled bronchodilator there is 18% improvement in FEV1.    3. Lung volumes demonstrate hyperinflation.    4. Gas exchange as estimated by DLCO is normal at 142% of predicted.    5. Airway resistance is increased.    Impression:    This study demonstrates the presence of mild obstructive pulmonary disease.  Definite reversibility is noted on the study.

## 2018-11-15 NOTE — PATIENT INSTRUCTIONS
1-doing well with colder weather  2-continue same respiratory meds  3-follow up in one year with PFT

## 2018-11-16 ENCOUNTER — TELEPHONE (OUTPATIENT)
Dept: PULMONOLOGY | Facility: HOSPICE | Age: 53
End: 2018-11-16

## 2018-11-16 VITALS
BODY MASS INDEX: 33.59 KG/M2 | HEART RATE: 70 BPM | HEIGHT: 67 IN | DIASTOLIC BLOOD PRESSURE: 80 MMHG | WEIGHT: 214 LBS | TEMPERATURE: 97.9 F | SYSTOLIC BLOOD PRESSURE: 122 MMHG | RESPIRATION RATE: 16 BRPM | OXYGEN SATURATION: 97 %

## 2018-11-16 NOTE — PROGRESS NOTES
CC    HPI:  Linda Garcia is a 53 y.o. year old female here today for follow-up on asthma.  Patient is a never smoker, retired .  Does have a history of MARIELA and is followed by sleep medicine for management of auto CPAP at 5-20 with last appointment 10/2017.  Patient had PFT testing done today results reviewed as compared to previous study completed 3/1/2017.  FVC was 3.04 L or 81% predicted as compared to 73% previously, FEV1 2.38 L or 80% predicted as compared to 69% previously, FEV1/FVC % was 97% as compared to 93% previously, FEF 25-75 %  2.09L or 74% predicted as compared to 56 % previously.  Postbronchodilator patient had FVC of 3.49 L or 93% predicted and increased to 15%, FEV1 of 2.81 L or 95% predicted an increase of 18%, FEF 25-75% of 3.72 L or 133% predicted increase of 77%.  Patient did have increased residual volume at 3.28 L or 169% predicted, total lung capacity of 6.71 L or 125% predicted and her DLCO was 142% predicted.  Reviewed these results with patient as well as in office vitals today including blood pressure of 122/80, oxygen saturation of 97% on room air, BMI of 33.5.  BMI is down as patient was 35.99 at last office visit.  Patient remarked making a conscious effort in this area.  Patient reports this is a good time of year for her as her breathing is better in cooler weather.  Current home respiratory regime consists of XYZ AL, Ventolin rescue inhaler 1-2 times a week, Singulair, Qvar, prescription sent for Ventolin, Singulair and Qvar.  Patient does need Singulair brand of montelukast due to red dye sensitivity which is present in generic brand.  On physical exam breath sounds were decreased slightly in left base.  Patient had done [place] chest x-[place] [employer] but no indication for an x-ray today.    ROS:  Constitutional: Patient does report ongoing fatigue with low vitamin D levels, denies fever, chills, night sweats, unintentional weight loss  Skin:  Denies rashes, hair or nail changes, lumps, sores  Eyes: Does wear glasses, denies sudden onset blurring or other vision changes  ENT: History of allergies, nasal congestion or runny nose, occasional itchy ear canals, does not wear dentures, denies hoarseness, sore throat  GI: Occasional heartburn or reflux, no difficulty swallowing  Respiratory: Patient denies cough, shortness of breath, history of pneumonia, bronchitis, no occupational exposure, she does report intermittent wheezing and mild shortness of breath with activity  CV: Does have a murmur, occasional palpitations for which she has metoprolol, denies chest pain or pressure, edema, orthopnea    Past Medical History:   Diagnosis Date   • Allergic rhinitis    • Allergy    • Asthma    • Chickenpox    • Heart palpitations    • Shingles        Past Surgical History:   Procedure Laterality Date   • VAGINAL HYSTERECTOMY SCOPE TOTAL  2/5/2009    Performed by GHULAM DAVILA at SURGERY SAME DAY [facility] ORS   • CYSTOSCOPY  2/5/2009    Performed by GHULAM DAVILA at SURGERY SAME DAY [facility]   • BREAST BIOPSY  1996   • ARTHROSCOPY, KNEE         Family History   Problem Relation Age of Onset   • Heart Disease Father    • Cancer Father    • Lung Cancer Mother    • Cancer Sister         Older syster HX: Lymphoma       Social History     Social History   • Marital status:      Spouse name: N/A   • Number of children: N/A   • Years of education: N/A     Occupational History   • Not on file.     Social History Main Topics   • Smoking status: Passive Smoke Exposure - Never Smoker   • Smokeless tobacco: Never Used   • Alcohol use Yes      Comment: mildly   • Drug use: No   • Sexual activity: Not on file     Other Topics Concern   • Not on file     Social History Narrative   • No narrative on file       Allergies as of 11/15/2018 - Reviewed 10/05/2018   Allergen Reaction Noted   • Asa [aspirin]  09/14/2016   • Food  12/06/2017   • Gadolinium  11/04/2013   •  "Red dye  04/05/2013   • [place]  04/05/2013   • Sulfa drugs  04/05/2013        @Vital signs for this encounter:  Vitals:    11/15/18 1400 11/15/18 1402   Height: 1.702 m (5' 7\")    Weight: 97.1 kg (214 lb)    Weight % change since last entry.: 0 %    BP: 136/78    Pulse: 70    BMI (Calculated): 33.52    Resp: 16    Temp: 36.6 °C (97.9 °F)    TempSrc: Oral    O2 sat % room air:  97 %       Current medications as of today   Current Outpatient Prescriptions   Medication Sig Dispense Refill   • SINGULAIR 10 MG Tab Take 1 Tab by mouth every day. 30 Tab 11   • albuterol (VENTOLIN HFA) 108 (90 Base) MCG/ACT Aero Soln inhalation aerosol INHALE 2 PUFFS BY MOUTH EVERY 4 HOURS AS NEEDED SHORTNESS OF BREATH 3 Inhaler 4   • beclomethasone (QVAR) 80 MCG/ACT inhaler Inhale 2 Puffs by mouth 2 times a day. Use spacer. Rinse mouth after each use. 1 Inhaler 6   • Cholecalciferol (VITAMIN D) 2000 UNIT Tab Take 1 Tab by mouth every day. 90 Tab 3   • XYZAL 5 MG Tab TAKE 1 TABLET BY MOUTH EVERY DAY AS NEEDED 90 Tab 0   • simvastatin (ZOCOR) 5 MG Tab Take 10 mg by mouth every evening.     • metoprolol (LOPRESSOR) 25 MG TABS Take 1 Tab by mouth 4 times a day as needed (palpitations). 360 Tab 1   • VENTOLIN  (90 Base) MCG/ACT Aero Soln inhalation aerosol INHALE 2 PUFFS BY MOUTH EVERY 4 HOURS AS NEEDED FOR SHORTNESS OF BREATH 1 Inhaler 5   • estradiol (ESTRACE) 0.1 MG/GM vaginal cream Apply pea sized opening to urethral opening and vaginal opening 3 nights per week (Patient not taking: Reported on 11/15/2018) 1 Tube 11   • albuterol (PROVENTIL) 2.5mg/3ml Nebu Soln solution for nebulization 3 mL by Nebulization route every four hours as needed for Shortness of Breath. 150 mL 0     No current facility-administered medications for this visit.          Physical Exam:   Gen:           Alert and oriented, No apparent distress. Mood and affect appropriate, normal interaction.  Eyes:          sclere white, conjunctive moist.  Hearing:   "   Grossly intact.  Dentition:    Good dentition.  Oropharynx:   Tongue normal, posterior pharynx without erythema or exudate.  Mallampati Classification: II-III  Neck:        Supple, trachea midline, no masses.  Respiratory Effort: No intercostal retractions or use of accessory muscles.   Lung Auscultation:      Decreased slightly in the left base otherwise clear to auscultation; no rales, rhonchi or wheezing.  CV:            Regular rate and rhythm.  Soft murmur, no rubs or gallops.  Digits, Nails, Ext: No clubbing, cyanosis, petechiae, or nodes.   Skin:        No rashes, lesions or ulcers noted on back or exposed skin surfaces                    Assessment:  1. Mild persistent asthma without complication  beclomethasone (QVAR) 80 MCG/ACT inhaler    PULMONARY FUNCTION TESTS -Test requested: Complete Pulmonary Function Test   2. Obstructive sleep apnea     3. Non-seasonal allergic rhinitis, unspecified trigger     4. Mitral valve prolapse         Immunizations:    Flu: Deferred  Pneumovax 23: Deferred  Prevnar 13: Deferred    Plan:  1-doing well with colder weather  2-continue same respiratory meds  3-follow up in one year with PFT    This dictation was created using voice recognition software. The accuracy of the dictation is limited to the abilities of the software. I expect there may be some errors of grammar and possibly content.

## 2018-11-17 NOTE — TELEPHONE ENCOUNTER
Have we ever prescribed this med? Yes.  If yes, what date? 11/15/2018    Last OV: 11/15/2018-Curt ALAMO    Next OV: 12/06/2018 Chavez RIDDLE    DX: Mild persistent asthma without complication (J45.30)    Medications: EMMIE Lopez

## 2018-11-17 NOTE — TELEPHONE ENCOUNTER
MEDICATION PRIOR AUTHORIZATION NEEDED:    1. Name of Medication: Singulair 10 mg    2. Requested By (Name of Pharmacy): [facility]     3. Is insurance on file current? yes    4. What is the name & phone number of the 3rd party payor? [employer] [phone]

## 2018-12-11 ENCOUNTER — PATIENT MESSAGE (OUTPATIENT)
Dept: SLEEP MEDICINE | Facility: MEDICAL CENTER | Age: 53
End: 2018-12-11

## 2018-12-11 NOTE — TELEPHONE ENCOUNTER
"----- Message from Linda Garcia sent at 12/11/2018 12:11 AM PST -----  Regarding: Non-Urgent Medical Question  Contact: [phone]  I called to advise that I would not be able to make my appt and cancelled.. I was NOT a \"no show\" for my appt! I had a sick child. The school called me to pick him up just 2 hours before my scheduled appointment. A completely unforeseen circumstance. Please correct this status to \"cancelled\" from \"no show\" I should not be responsible for this. Thank you.   Sincerely   Linda Lyles  "

## 2019-01-02 DIAGNOSIS — J45.909 MILD ASTHMA, UNSPECIFIED WHETHER COMPLICATED, UNSPECIFIED WHETHER PERSISTENT: ICD-10-CM

## 2019-01-02 NOTE — TELEPHONE ENCOUNTER
Have we ever prescribed this med? Yes.  If yes, what date? 11/15/18     Last OV: 11/15/18 TORSTEN Elias P.A.-C    Next OV: Mo pending appt     DX: Uncomplicated asthma    Medications: Singulair 10 mg tabs       This RX was requested via fax by by [facility]'s pharmacy

## 2019-01-10 ENCOUNTER — TELEPHONE (OUTPATIENT)
Dept: PULMONOLOGY | Facility: HOSPICE | Age: 54
End: 2019-01-10

## 2019-01-10 NOTE — LETTER
February 12, 2019        To whom it may concern,      Regarding:Linda Garcia  is followed at our pulmonary clinic for asthma. Her asthma has been well controlled on Singulair and she is advised to continue with Singulair for treatment.  It is my understanding that prescription coverage of Singulair has been denied-given her excellent clinical response to Singulair and stability in asthma control, we would appreciate approval of Singulair usage.              Sincerely,        Malena Lei MD

## 2019-01-11 NOTE — TELEPHONE ENCOUNTER
MEDICATION PRIOR AUTHORIZATION NEEDED:    1. Name of Medication: Singulair 10 mg    2. Requested By (Name of Pharmacy): [facility]     3. Is insurance on file current? yes    4. What is the name & phone number of the 3rd party payor? [facility] [phone]

## 2019-02-06 RX ORDER — ZAFIRLUKAST 10 MG/1
10 TABLET, FILM COATED ORAL 2 TIMES DAILY
Qty: 60 TAB | Refills: 3 | Status: SHIPPED | OUTPATIENT
Start: 2019-02-06

## 2019-02-06 NOTE — TELEPHONE ENCOUNTER
DOCUMENTATION OF PRIOR AUTH STATUS    1. Medication name and dose: Singulair 10 mg-brand only    2. Name and Phone # of Prescription coverage company: [employer] [phone]    3. Date Prior Auth was submitted: 1/21/2019    4. What information was given to obtain insurance decision: Clinical notes    5. Prior Auth letter Approved or Denied: Denied    6. Pharmacy notified: No    7. Patient notified: No        Brand name Singulair 10 mg has been denied.  The patient must try Accolate.  Dx is asthma.  Please advise, thank you.

## 2019-02-08 NOTE — TELEPHONE ENCOUNTER
I spoke with the patient about the denial of the Singulair.  She will try the Accolate.  She is asking if Dr. Lei would write a letter for another appeal.  She said that the letter needs to state that her asthma was not under control 11 years ago before she started Singulair and how much the Singulair has helped control her asthma symptoms.  Please advise, thank you.

## 2019-08-21 ENCOUNTER — APPOINTMENT (RX ONLY)
Dept: URBAN - METROPOLITAN AREA CLINIC 22 | Facility: CLINIC | Age: 54
Setting detail: DERMATOLOGY
End: 2019-08-21

## 2019-08-21 DIAGNOSIS — L81.4 OTHER MELANIN HYPERPIGMENTATION: ICD-10-CM

## 2019-08-21 DIAGNOSIS — L82.1 OTHER SEBORRHEIC KERATOSIS: ICD-10-CM

## 2019-08-21 DIAGNOSIS — D18.0 HEMANGIOMA: ICD-10-CM

## 2019-08-21 DIAGNOSIS — L57.0 ACTINIC KERATOSIS: ICD-10-CM

## 2019-08-21 DIAGNOSIS — D22 MELANOCYTIC NEVI: ICD-10-CM

## 2019-08-21 DIAGNOSIS — Z71.89 OTHER SPECIFIED COUNSELING: ICD-10-CM

## 2019-08-21 PROBLEM — D22.5 MELANOCYTIC NEVI OF TRUNK: Status: ACTIVE | Noted: 2019-08-21

## 2019-08-21 PROBLEM — J45.909 UNSPECIFIED ASTHMA, UNCOMPLICATED: Status: ACTIVE | Noted: 2019-08-21

## 2019-08-21 PROBLEM — D48.5 NEOPLASM OF UNCERTAIN BEHAVIOR OF SKIN: Status: ACTIVE | Noted: 2019-08-21

## 2019-08-21 PROBLEM — D18.01 HEMANGIOMA OF SKIN AND SUBCUTANEOUS TISSUE: Status: ACTIVE | Noted: 2019-08-21

## 2019-08-21 PROCEDURE — ? COUNSELING

## 2019-08-21 PROCEDURE — 11102 TANGNTL BX SKIN SINGLE LES: CPT

## 2019-08-21 PROCEDURE — ? BIOPSY BY SHAVE METHOD

## 2019-08-21 PROCEDURE — ? LIQUID NITROGEN

## 2019-08-21 PROCEDURE — 99214 OFFICE O/P EST MOD 30 MIN: CPT | Mod: 25

## 2019-08-21 PROCEDURE — 17003 DESTRUCT PREMALG LES 2-14: CPT

## 2019-08-21 PROCEDURE — 17000 DESTRUCT PREMALG LESION: CPT | Mod: 59

## 2019-08-21 ASSESSMENT — LOCATION ZONE DERM
LOCATION ZONE: TRUNK
LOCATION ZONE: NOSE
LOCATION ZONE: FACE

## 2019-08-21 ASSESSMENT — LOCATION DETAILED DESCRIPTION DERM
LOCATION DETAILED: LEFT MEDIAL SUPERIOR CHEST
LOCATION DETAILED: RIGHT NASAL ALA
LOCATION DETAILED: LEFT INFERIOR MEDIAL FOREHEAD
LOCATION DETAILED: RIGHT SUPERIOR MEDIAL MIDBACK
LOCATION DETAILED: RIGHT CENTRAL EYEBROW
LOCATION DETAILED: LEFT SUPERIOR MEDIAL UPPER BACK
LOCATION DETAILED: INFERIOR MID FOREHEAD
LOCATION DETAILED: EPIGASTRIC SKIN

## 2019-08-21 ASSESSMENT — LOCATION SIMPLE DESCRIPTION DERM
LOCATION SIMPLE: INFERIOR FOREHEAD
LOCATION SIMPLE: ABDOMEN
LOCATION SIMPLE: RIGHT LOWER BACK
LOCATION SIMPLE: LEFT FOREHEAD
LOCATION SIMPLE: CHEST
LOCATION SIMPLE: RIGHT NOSE
LOCATION SIMPLE: LEFT UPPER BACK
LOCATION SIMPLE: RIGHT EYEBROW

## 2019-08-21 NOTE — PROCEDURE: BIOPSY BY SHAVE METHOD
Was A Bandage Applied: Yes
Render Path Notes In Note?: No
Silver Nitrate Text: The wound bed was treated with silver nitrate after the biopsy was performed.
Curettage Text: The wound bed was treated with curettage after the biopsy was performed.
Biopsy Method: Personna blade
Hemostasis: Drysol
Detail Level: Detailed
Billing Type: Third-Party Bill
Lab: [id number]
Lab Facility: 
Size Of Lesion In Cm: 0
Anesthesia Type: 1% lidocaine with 1:100,000 epinephrine and a 1:3 solution of 8.4% sodium bicarbonate
Cryotherapy Text: The wound bed was treated with cryotherapy after the biopsy was performed.
Post-Care Instructions: I reviewed with the patient in detail post-care instructions. Patient is to keep the biopsy site dry overnight, and then apply bacitracin twice daily until healed. Patient may apply hydrogen peroxide soaks to remove any crusting.
Wound Care: Vaseline
Consent: Written consent was obtained and risks were reviewed including but not limited to scarring, infection, bleeding, scabbing, incomplete removal, nerve damage and allergy to anesthesia.
Biopsy Type: H and E
Electrodesiccation Text: The wound bed was treated with electrodesiccation after the biopsy was performed.
Notification Instructions: Patient will be notified of biopsy results. However, patient instructed to call the office if not contacted within 2 weeks.
Depth Of Biopsy: dermis
Electrodesiccation And Curettage Text: The wound bed was treated with electrodesiccation and curettage after the biopsy was performed.
Type Of Destruction Used: Curettage
Dressing: bandage
Anesthesia Volume In Cc: 1

## 2020-06-01 NOTE — MR AVS SNAPSHOT
Linda Garcia   2017 2:00 PM   Appointment   MRN: [id number]    Department:  [facility]   Dept Phone:  [phone]    Description:  Female : 1965   Provider:  [facility]           Allergies as of 2017     Allergen Noted Reactions    Asa [Aspirin] 2016       Gadolinium 2013       HIVES    Red Dye 2013       Manito 2013       Sulfa Drugs 2013         You were diagnosed with     MARIELA (obstructive sleep apnea)   [[id number]]         Vital Signs     Smoking Status                   Passive Smoke Exposure - Never Smoker           Basic Information     Date Of Birth Sex Race Ethnicity Preferred Language    1965 Female White Non- English      Problem List              ICD-10-CM Priority Class Noted - Resolved    DUB (dysfunctional uterine bleeding), s/p YANDY 2009 N93.8   2013 - Present    ASTHMA    2013 - Present    Palpitations R00.2   2013 - Present      Health Maintenance        Date Due Completion Dates    IMM DTaP/Tdap/Td Vaccine (1 - Tdap) 1985 ---    IMM PNEUMOCOCCAL 19-64 (ADULT) MEDIUM RISK SERIES (1 of 1 - PPSV23) 1985 ---    PAP SMEAR 1987 ---    COLONOSCOPY 2016 ---    MAMMOGRAM 10/25/2017 10/25/2016, 2015, 2014, 2013, 2012, 2010, 2010, 12/3/2009, 12/3/2009, 10/3/2008, 10/3/2008, 2007, 2007, 2006, 2006, 2005            Current Immunizations     No immunizations on file.      Below and/or attached are the medications your provider expects you to take. Review all of your home medications and newly ordered medications with your provider and/or pharmacist. Follow medication instructions as directed by your provider and/or pharmacist. Please keep your medication list with you and share with your provider. Update the information when medications are discontinued, doses are changed, or new medications (including over-the-counter products) are added; and  normal appearance , no deformities carry medication information at all times in the event of emergency situations     Allergies:  ASA - (reactions not documented)     GADOLINIUM - (reactions not documented)     RED DYE - (reactions not documented)     STRAWBERRY - (reactions not documented)     SULFA DRUGS - (reactions not documented)               Medications  Valid as of: April 12, 2017 -  2:40 PM    Generic Name Brand Name Tablet Size Instructions for use    Albuterol Sulfate (Aero Soln) albuterol 108 (90 BASE) MCG/ACT Inhale 2 Puffs by mouth every 6 hours as needed.        Albuterol Sulfate (Aero Soln) VENTOLIN  (90 BASE) MCG/ACT INHALE 2 PUFFS BY MOUTH EVERY 4 HOURS AS NEEDED SHORTNESS OF BREATH        Albuterol Sulfate (Aero Soln) VENTOLIN  (90 BASE) MCG/ACT INHALE 2 PUFFS BY MOUTH EVERY 4 HOURS AS NEEDED FOR SHORTNESS OF BREATH        Beclomethasone Dipropionate   Inhale  by mouth.        Beclomethasone Dipropionate (Aero Soln) QVAR 40 MCG/ACT Inhale 1 Puff by mouth 2 Times a Day.        Beclomethasone Dipropionate (Aero Soln) QVAR 80 MCG/ACT Inhale 2 Puffs by mouth 2 times a day. Use spacer. Rinse mouth after each use.        Levocetirizine Dihydrochloride (Tab) XYZAL 5 MG TAKE 1 TABLET BY MOUTH EVERY DAY AS NEEDED        Metoprolol Tartrate (Tab) LOPRESSOR 25 MG Take 1 Tab by mouth 4 times a day as needed (palpitations).        Montelukast Sodium (Tab) SINGULAIR 10 MG TAKE 1 TABLET BY MOUTH EVERY DAY        Simvastatin (Tab) ZOCOR 5 MG Take 5 mg by mouth every evening.        .                 Medicines prescribed today were sent to:     [id number] - [place], [place] - [address] [facility] [address]    [address] [facility] [address]    Phone: [phone] Fax: [phone]    Open 24 Hours?: No      Medication refill instructions:       If your prescription bottle indicates you have medication refills left, it is not necessary to call your provider’s office. Please contact your pharmacy and they will  refill your medication.    If your prescription bottle indicates you do not have any refills left, you may request refills at any time through one of the following ways: The online Ambow Education system (except Urgent Care), by calling your provider’s office, or by asking your pharmacy to contact your provider’s office with a refill request. Medication refills are processed only during regular business hours and may not be available until the next business day. Your provider may request additional information or to have a follow-up visit with you prior to refilling your medication.   *Please Note: Medication refills are assigned a new Rx number when refilled electronically. Your pharmacy may indicate that no refills were authorized even though a new prescription for the same medication is available at the pharmacy. Please request the medicine by name with the pharmacy before contacting your provider for a refill.           Ambow Education Access Code: [email or web address]  Expires: 5/12/2017  2:40 PM    Ambow Education  A secure, online tool to manage your health information     [employer] Amb[employer] Education® is a secure, online tool that connects you to your personalized health information from the privacy of your home -- day or night - making it very easy for you to manage your healthcare. Once the activation process is completed, you can even access your medical information using the Ambow Education jefe, which is available for free in the Apple Jefe store or Google Play store.     Ambow Education provides the following levels of access (as shown below):   My Chart Features   [facility] Doctor [facility]  Urgent  Care Non-[facility]  Primary Care  Doctor   Email your healthcare team securely and privately 24/7 X X X    Manage appointments: schedule your next appointment; view details of past/upcoming appointments X      Request prescription refills. X      View recent personal medical records, including lab and immunizations X X X X   View  health record, including health history, allergies, medications X X X X   Read reports about your outpatient visits, procedures, consult and ER notes X X X X   See your discharge summary, which is a recap of your hospital and/or ER visit that includes your diagnosis, lab results, and care plan. X X       How to register for Pingpigeon:  1. Go to  [email or web address].  2. Click on the Sign Up Now box, which takes you to the New Member Sign Up page. You will need to provide the following information:  a. Enter your Pingpigeon Access Code exactly as it appears at the top of this page. (You will not need to use this code after you’ve completed the sign-up process. If you do not sign up before the expiration date, you must request a new code.)   b. Enter your date of birth.   c. Enter your home email address.   d. Click Submit, and follow the next screen’s instructions.  3. Create a Pingpigeon ID. This will be your Pingpigeon login ID and cannot be changed, so think of one that is secure and easy to remember.  4. Create a [employer] password. You can change your password at any time.  5. Enter your Password Reset Question and Answer. This can be used at a later time if you forget your password.   6. Enter your e-mail address. This allows you to receive e-mail notifications when new information is available in Pingpigeon.  7. Click Sign Up. You can now view your health information.    For assistance activating your Pingpigeon account, call [phone]

## 2021-04-06 ENCOUNTER — APPOINTMENT (RX ONLY)
Dept: URBAN - METROPOLITAN AREA CLINIC 22 | Facility: CLINIC | Age: 56
Setting detail: DERMATOLOGY
End: 2021-04-06

## 2021-04-06 DIAGNOSIS — L81.0 POSTINFLAMMATORY HYPERPIGMENTATION: ICD-10-CM

## 2021-04-06 DIAGNOSIS — L82.1 OTHER SEBORRHEIC KERATOSIS: ICD-10-CM

## 2021-04-06 DIAGNOSIS — I78.8 OTHER DISEASES OF CAPILLARIES: ICD-10-CM

## 2021-04-06 DIAGNOSIS — L44.8 OTHER SPECIFIED PAPULOSQUAMOUS DISORDERS: ICD-10-CM

## 2021-04-06 DIAGNOSIS — L73.9 FOLLICULAR DISORDER, UNSPECIFIED: ICD-10-CM

## 2021-04-06 DIAGNOSIS — L81.4 OTHER MELANIN HYPERPIGMENTATION: ICD-10-CM

## 2021-04-06 DIAGNOSIS — D22 MELANOCYTIC NEVI: ICD-10-CM

## 2021-04-06 DIAGNOSIS — Z71.89 OTHER SPECIFIED COUNSELING: ICD-10-CM

## 2021-04-06 DIAGNOSIS — L57.0 ACTINIC KERATOSIS: ICD-10-CM

## 2021-04-06 DIAGNOSIS — L663 OTHER SPECIFIED DISEASES OF HAIR AND HAIR FOLLICLES: ICD-10-CM

## 2021-04-06 DIAGNOSIS — L738 OTHER SPECIFIED DISEASES OF HAIR AND HAIR FOLLICLES: ICD-10-CM

## 2021-04-06 PROBLEM — L02.425 FURUNCLE OF RIGHT LOWER LIMB: Status: ACTIVE | Noted: 2021-04-06

## 2021-04-06 PROBLEM — D22.5 MELANOCYTIC NEVI OF TRUNK: Status: ACTIVE | Noted: 2021-04-06

## 2021-04-06 PROBLEM — L02.821 FURUNCLE OF HEAD [ANY PART, EXCEPT FACE]: Status: ACTIVE | Noted: 2021-04-06

## 2021-04-06 PROCEDURE — 17003 DESTRUCT PREMALG LES 2-14: CPT

## 2021-04-06 PROCEDURE — ? COUNSELING

## 2021-04-06 PROCEDURE — ? ADDITIONAL NOTES

## 2021-04-06 PROCEDURE — ? SUNSCREEN TREATMENT REGIMEN

## 2021-04-06 PROCEDURE — 99213 OFFICE O/P EST LOW 20 MIN: CPT | Mod: 25

## 2021-04-06 PROCEDURE — 17000 DESTRUCT PREMALG LESION: CPT

## 2021-04-06 PROCEDURE — ? LIQUID NITROGEN

## 2021-04-06 ASSESSMENT — LOCATION DETAILED DESCRIPTION DERM
LOCATION DETAILED: LEFT ANTERIOR DISTAL THIGH
LOCATION DETAILED: RIGHT CENTRAL MALAR CHEEK
LOCATION DETAILED: SUPERIOR THORACIC SPINE
LOCATION DETAILED: RIGHT UPPER CUTANEOUS LIP
LOCATION DETAILED: INFERIOR MID FOREHEAD
LOCATION DETAILED: MIDDLE STERNUM
LOCATION DETAILED: RIGHT ANTERIOR MEDIAL PROXIMAL THIGH
LOCATION DETAILED: NASAL DORSUM
LOCATION DETAILED: RIGHT VENTRAL PROXIMAL FOREARM
LOCATION DETAILED: LEFT ANTERIOR DISTAL THIGH
LOCATION DETAILED: LOWER STERNUM
LOCATION DETAILED: LEFT VENTRAL PROXIMAL FOREARM
LOCATION DETAILED: RIGHT NASAL SIDEWALL
LOCATION DETAILED: LEFT SUPERIOR PARIETAL SCALP

## 2021-04-06 ASSESSMENT — LOCATION SIMPLE DESCRIPTION DERM
LOCATION SIMPLE: LEFT THIGH
LOCATION SIMPLE: LEFT THIGH
LOCATION SIMPLE: INFERIOR FOREHEAD
LOCATION SIMPLE: CHEST
LOCATION SIMPLE: RIGHT THIGH
LOCATION SIMPLE: NOSE
LOCATION SIMPLE: UPPER BACK
LOCATION SIMPLE: RIGHT CHEEK
LOCATION SIMPLE: SCALP
LOCATION SIMPLE: RIGHT NOSE
LOCATION SIMPLE: LEFT FOREARM
LOCATION SIMPLE: RIGHT FOREARM
LOCATION SIMPLE: RIGHT LIP

## 2021-04-06 ASSESSMENT — LOCATION ZONE DERM
LOCATION ZONE: LEG
LOCATION ZONE: SCALP
LOCATION ZONE: LIP
LOCATION ZONE: TRUNK
LOCATION ZONE: ARM
LOCATION ZONE: NOSE
LOCATION ZONE: LEG
LOCATION ZONE: FACE

## 2021-04-06 NOTE — PROCEDURE: ADDITIONAL NOTES
Render Risk Assessment In Note?: no
Additional Notes: Recommended salicylic acid BID or benzoyl peroxide spot treatment
Detail Level: Detailed
Additional Notes: Scalp is clear today however patient reports she has bumps on the scalp that seem to be present\\nCould be folliculitis. Will monitor and re evaluate if worsens or continues.

## 2021-11-29 ENCOUNTER — APPOINTMENT (RX ONLY)
Dept: URBAN - METROPOLITAN AREA CLINIC 22 | Facility: CLINIC | Age: 56
Setting detail: DERMATOLOGY
End: 2021-11-29

## 2021-11-29 DIAGNOSIS — L30.9 DERMATITIS, UNSPECIFIED: ICD-10-CM | Status: RESOLVED

## 2021-11-29 DIAGNOSIS — L81.4 OTHER MELANIN HYPERPIGMENTATION: ICD-10-CM

## 2021-11-29 DIAGNOSIS — L73.8 OTHER SPECIFIED FOLLICULAR DISORDERS: ICD-10-CM

## 2021-11-29 PROCEDURE — ? COUNSELING

## 2021-11-29 PROCEDURE — 99213 OFFICE O/P EST LOW 20 MIN: CPT

## 2021-11-29 ASSESSMENT — LOCATION SIMPLE DESCRIPTION DERM
LOCATION SIMPLE: NECK
LOCATION SIMPLE: RIGHT LIP
LOCATION SIMPLE: CHEST

## 2021-11-29 ASSESSMENT — LOCATION ZONE DERM
LOCATION ZONE: TRUNK
LOCATION ZONE: LIP
LOCATION ZONE: NECK

## 2021-11-29 ASSESSMENT — LOCATION DETAILED DESCRIPTION DERM
LOCATION DETAILED: RIGHT SUPERIOR VERMILION LIP
LOCATION DETAILED: UPPER STERNUM
LOCATION DETAILED: RIGHT SUPERIOR LATERAL NECK

## 2021-11-29 NOTE — PROCEDURE: COUNSELING
Detail Level: Zone
Detail Level: Simple
Patient Specific Counseling (Will Not Stick From Patient To Patient): Resolved with no pigmentary changes

## 2021-11-29 NOTE — HPI: SHINGLES (HERPES ZOSTER)
How Severe Are Your Shingles?: moderate
Is This A New Presentation, Or A Follow-Up?: Shingles
Additional History: Patient states this rash stared in august and has since resolved.

## 2022-01-08 ENCOUNTER — APPOINTMENT (OUTPATIENT)
Dept: RADIOLOGY | Facility: MEDICAL CENTER | Age: 57
End: 2022-01-08
Attending: EMERGENCY MEDICINE
Payer: COMMERCIAL

## 2022-01-08 ENCOUNTER — HOSPITAL ENCOUNTER (EMERGENCY)
Facility: MEDICAL CENTER | Age: 57
End: 2022-01-08
Attending: EMERGENCY MEDICINE
Payer: COMMERCIAL

## 2022-01-08 VITALS
DIASTOLIC BLOOD PRESSURE: 76 MMHG | RESPIRATION RATE: 18 BRPM | SYSTOLIC BLOOD PRESSURE: 123 MMHG | OXYGEN SATURATION: 94 % | TEMPERATURE: 97.1 F | HEIGHT: 67 IN | HEART RATE: 88 BPM | WEIGHT: 220.46 LBS | BODY MASS INDEX: 34.6 KG/M2

## 2022-01-08 DIAGNOSIS — U07.1 COVID-19: ICD-10-CM

## 2022-01-08 LAB
ALBUMIN SERPL BCP-MCNC: 4.4 G/DL (ref 3.2–4.9)
ALBUMIN/GLOB SERPL: 1.4 G/DL
ALP SERPL-CCNC: 81 U/L (ref 30–99)
ALT SERPL-CCNC: 15 U/L (ref 2–50)
ANION GAP SERPL CALC-SCNC: 10 MMOL/L (ref 7–16)
AST SERPL-CCNC: 15 U/L (ref 12–45)
BASOPHILS # BLD AUTO: 0.4 % (ref 0–1.8)
BASOPHILS # BLD: 0.03 K/UL (ref 0–0.12)
BILIRUB SERPL-MCNC: 0.3 MG/DL (ref 0.1–1.5)
BUN SERPL-MCNC: 15 MG/DL (ref 8–22)
CALCIUM SERPL-MCNC: 9.3 MG/DL (ref 8.4–10.2)
CHLORIDE SERPL-SCNC: 102 MMOL/L (ref 96–112)
CO2 SERPL-SCNC: 26 MMOL/L (ref 20–33)
CREAT SERPL-MCNC: 0.77 MG/DL (ref 0.5–1.4)
EOSINOPHIL # BLD AUTO: 0.11 K/UL (ref 0–0.51)
EOSINOPHIL NFR BLD: 1.6 % (ref 0–6.9)
ERYTHROCYTE [DISTWIDTH] IN BLOOD BY AUTOMATED COUNT: 43.8 FL (ref 35.9–50)
GLOBULIN SER CALC-MCNC: 3.1 G/DL (ref 1.9–3.5)
GLUCOSE SERPL-MCNC: 101 MG/DL (ref 65–99)
HCT VFR BLD AUTO: 43.4 % (ref 37–47)
HGB BLD-MCNC: 13.9 G/DL (ref 12–16)
IMM GRANULOCYTES # BLD AUTO: 0.01 K/UL (ref 0–0.11)
IMM GRANULOCYTES NFR BLD AUTO: 0.1 % (ref 0–0.9)
LYMPHOCYTES # BLD AUTO: 1.35 K/UL (ref 1–4.8)
LYMPHOCYTES NFR BLD: 19.3 % (ref 22–41)
MCH RBC QN AUTO: 29.4 PG (ref 27–33)
MCHC RBC AUTO-ENTMCNC: 32 G/DL (ref 33.6–35)
MCV RBC AUTO: 91.9 FL (ref 81.4–97.8)
MONOCYTES # BLD AUTO: 0.8 K/UL (ref 0–0.85)
MONOCYTES NFR BLD AUTO: 11.4 % (ref 0–13.4)
NEUTROPHILS # BLD AUTO: 4.7 K/UL (ref 2–7.15)
NEUTROPHILS NFR BLD: 67.2 % (ref 44–72)
NRBC # BLD AUTO: 0 K/UL
NRBC BLD-RTO: 0 /100 WBC
PLATELET # BLD AUTO: 274 K/UL (ref 164–446)
PMV BLD AUTO: 9.9 FL (ref 9–12.9)
POTASSIUM SERPL-SCNC: 3.9 MMOL/L (ref 3.6–5.5)
PROT SERPL-MCNC: 7.5 G/DL (ref 6–8.2)
RBC # BLD AUTO: 4.72 M/UL (ref 4.2–5.4)
SODIUM SERPL-SCNC: 138 MMOL/L (ref 135–145)
WBC # BLD AUTO: 7 K/UL (ref 4.8–10.8)

## 2022-01-08 PROCEDURE — 71045 X-RAY EXAM CHEST 1 VIEW: CPT

## 2022-01-08 PROCEDURE — 99283 EMERGENCY DEPT VISIT LOW MDM: CPT

## 2022-01-08 PROCEDURE — 80053 COMPREHEN METABOLIC PANEL: CPT

## 2022-01-08 PROCEDURE — 93005 ELECTROCARDIOGRAM TRACING: CPT | Performed by: EMERGENCY MEDICINE

## 2022-01-08 PROCEDURE — 85025 COMPLETE CBC W/AUTO DIFF WBC: CPT

## 2022-01-08 NOTE — ED PROVIDER NOTES
"ED Provider Note    CHIEF COMPLAINT  Chief Complaint   Patient presents with   • Shortness of Breath   • Coronavirus Screening       HPI  Linda Garcia is a 56 y.o. female who presents to the emergency department with primary request of monoclonal antibodies after home test showing positive COVID results. She does have a history of asthma.  is an oncology patient was provided with monoclonal antibodies which Peter interested in potentially getting the same therapy. She is not had any G.I. symptoms. No fever chills. Has some myalgias.    Unvaccinated      REVIEW OF SYSTEMS  See HPI for further details. All other systems are negative.     PAST MEDICAL HISTORY   has a past medical history of Allergic rhinitis, Allergy, Asthma, Chickenpox, Heart palpitations, and Shingles.    SOCIAL HISTORY  Social History     Tobacco Use   • Smoking status: Passive Smoke Exposure - Never Smoker   • Smokeless tobacco: Never Used   Substance and Sexual Activity   • Alcohol use: Yes     Comment: Occasiopnally   • Drug use: No   • Sexual activity: Not on file       SURGICAL HISTORY   has a past surgical history that includes vaginal hysterectomy scope total (2/5/2009); cystoscopy (2/5/2009); breast biopsy (1996); and arthroscopy, knee.    CURRENT MEDICATIONS  Home Medications    **Home medications have not yet been reviewed for this encounter**         ALLERGIES  Allergies   Allergen Reactions   • Asa [Aspirin]    • Food      Shell fish   • Gadolinium      HIVES   • Red Dye      And blue dyes   • Strawberry    • Sulfa Drugs        PHYSICAL EXAM  VITAL SIGNS: /83   Pulse 89   Temp 37.1 °C (98.7 °F) (Temporal)   Resp 18   Ht 1.702 m (5' 7\")   Wt 100 kg (220 lb 7.4 oz)   SpO2 98%   BMI 34.53 kg/m²  @BARBARA[[id number]::@  Pulse ox interpretation: I interpret this pulse ox as normal.  Constitutional: Alert in no apparent distress.  HENT: Normocephalic, Atraumatic, Bilateral external ears normal. Nose normal.   Eyes: Pupils are " equal and reactive. Conjunctiva normal, non-icteric.   Heart: Regular rate and rythm, no murmurs.    Lungs: Clear to auscultation bilaterally.  Skin: Warm, Dry, No erythema, No rash.   Neurologic: Alert, Grossly non-focal.   Psychiatric: Affect normal, Judgment normal, Mood normal, Appears appropriate and not intoxicated.           COURSE & MEDICAL DECISION MAKING  Pertinent Labs & Imaging studies reviewed. (See chart for details)    56-year-old female presented emergency room and with primary request for monoclonal antibody therapy for COVID-19. She had a positive home test and multiple known sick contacts. She is now been symptomatic for approximately a few days and is aware that there is a short window for treatment with monoclonal alibis and therefore presents today for this. While the patient does have some risk factors including asthma and obesity at this point I have had prolonged bedside conversation about the fact that the current medications are under EUA and exact risks and benefits are yet to be fully understood. Furthermore after conversing with our pharmacy staff medical antibodies are unavailable and similarly oral general is also unavailable at this point other than very specific high risk populations. For this reason I have conveyed to the patient that we are currently unable to provide this therapy as requested and I have strongly encouraged her to continue with conservative care measures at home. She is understanding of ongoing home monitoring as well as strict return precautions should she have any changes or worsening in the interim.    The patient will return for worsening symptoms and is stable at the time of discharge. The patient verbalizes understanding and will comply.    FINAL IMPRESSION  1. COVID-19               Electronically signed by: Harshil Figueroa M.D., 1/8/2022 3:42 PM

## 2022-01-08 NOTE — ED TRIAGE NOTES
"Presents with a hx of Asthma.  She reports that her  was diagnosed with a positive Coronavirus status this past Tuesday.   She C/O escalating exertional dyspnea recurring since yesterday.  Chief Complaint   Patient presents with   • Shortness of Breath   • Coronavirus Screening     /83   Pulse 89   Temp 37.1 °C (98.7 °F) (Temporal)   Resp 18   Ht 1.702 m (5' 7\")   Wt 100 kg (220 lb 7.4 oz)   SpO2 98%   BMI 34.53 kg/m²   Has this patient been vaccinated for COVID NO  If not, would they like to be vaccinated while in the ER if eligible?  No  Would the patient like to speak with the ERP about the possibility of receiving the COVID vaccine today before making a decision? NO     "

## 2022-01-08 NOTE — ED NOTES
Discharged to home with instructions to follow up with her doctor, rest, drink fluids, and self isolate until symptoms are gone.

## 2022-11-30 ENCOUNTER — APPOINTMENT (RX ONLY)
Dept: URBAN - METROPOLITAN AREA CLINIC 22 | Facility: CLINIC | Age: 57
Setting detail: DERMATOLOGY
End: 2022-11-30

## 2022-11-30 DIAGNOSIS — L82.1 OTHER SEBORRHEIC KERATOSIS: ICD-10-CM

## 2022-11-30 DIAGNOSIS — L81.4 OTHER MELANIN HYPERPIGMENTATION: ICD-10-CM

## 2022-11-30 DIAGNOSIS — Z71.89 OTHER SPECIFIED COUNSELING: ICD-10-CM

## 2022-11-30 DIAGNOSIS — L57.0 ACTINIC KERATOSIS: ICD-10-CM

## 2022-11-30 DIAGNOSIS — D22 MELANOCYTIC NEVI: ICD-10-CM

## 2022-11-30 DIAGNOSIS — L72.0 EPIDERMAL CYST: ICD-10-CM

## 2022-11-30 PROBLEM — D22.5 MELANOCYTIC NEVI OF TRUNK: Status: ACTIVE | Noted: 2022-11-30

## 2022-11-30 PROBLEM — D23.4 OTHER BENIGN NEOPLASM OF SKIN OF SCALP AND NECK: Status: ACTIVE | Noted: 2022-11-30

## 2022-11-30 PROCEDURE — ? LIQUID NITROGEN

## 2022-11-30 PROCEDURE — 99213 OFFICE O/P EST LOW 20 MIN: CPT | Mod: 25

## 2022-11-30 PROCEDURE — 17003 DESTRUCT PREMALG LES 2-14: CPT

## 2022-11-30 PROCEDURE — ? SUNSCREEN TREATMENT REGIMEN

## 2022-11-30 PROCEDURE — 17000 DESTRUCT PREMALG LESION: CPT

## 2022-11-30 PROCEDURE — 10060 I&D ABSCESS SIMPLE/SINGLE: CPT

## 2022-11-30 PROCEDURE — ? COUNSELING

## 2022-11-30 PROCEDURE — ? INCISION AND DRAINAGE

## 2022-11-30 ASSESSMENT — LOCATION ZONE DERM
LOCATION ZONE: FACE
LOCATION ZONE: ARM
LOCATION ZONE: LIP
LOCATION ZONE: SCALP
LOCATION ZONE: TRUNK

## 2022-11-30 ASSESSMENT — LOCATION DETAILED DESCRIPTION DERM
LOCATION DETAILED: INFERIOR THORACIC SPINE
LOCATION DETAILED: RIGHT CENTRAL EYEBROW
LOCATION DETAILED: INFERIOR MID FOREHEAD
LOCATION DETAILED: RIGHT UPPER CUTANEOUS LIP
LOCATION DETAILED: LEFT LATERAL SHOULDER
LOCATION DETAILED: GLABELLA
LOCATION DETAILED: RIGHT VENTRAL PROXIMAL FOREARM
LOCATION DETAILED: UPPER STERNUM
LOCATION DETAILED: SUPERIOR THORACIC SPINE
LOCATION DETAILED: LEFT VENTRAL PROXIMAL FOREARM
LOCATION DETAILED: RIGHT LATERAL FRONTAL SCALP

## 2022-11-30 ASSESSMENT — LOCATION SIMPLE DESCRIPTION DERM
LOCATION SIMPLE: LEFT FOREARM
LOCATION SIMPLE: CHEST
LOCATION SIMPLE: UPPER BACK
LOCATION SIMPLE: RIGHT LIP
LOCATION SIMPLE: INFERIOR FOREHEAD
LOCATION SIMPLE: RIGHT FOREARM
LOCATION SIMPLE: GLABELLA
LOCATION SIMPLE: LEFT SHOULDER
LOCATION SIMPLE: RIGHT EYEBROW
LOCATION SIMPLE: RIGHT SCALP

## 2022-11-30 NOTE — PROCEDURE: INCISION AND DRAINAGE
Detail Level: Detailed
Lesion Type: Cyst
Method: 11 blade
Curette: Yes
Include Sutures?: No
Anesthesia Type: 0.5% lidocaine without epinephrine
Anesthesia Volume In Cc: 3
Size Of Lesion In Cm (Optional But May Be Required For Some Insurances): 1
Drainage Amount?: moderate
Drainage Type?: cyst-like
Wound Care: Petrolatum
Dressing: Band-Aid
Epidermal Sutures: 4-0 Ethilon
Epidermal Closure: simple interrupted
Suture Text: The incision was partially closed with
Preparation Text: The area was prepped in the usual clean fashion.
Curette Text (Optional): After the contents were expressed a curette was used to partially remove the cyst wall.
Consent was obtained and risks were reviewed including but not limited to delayed wound healing, infection, scar, need for multiple I and D's, and pain.
Post-Care Instructions: I reviewed with the patient in detail post-care instructions. Patient should keep wound covered and call the office should any redness, pain, swelling or worsening occur.

## 2022-11-30 NOTE — PROCEDURE: LIQUID NITROGEN
Render Note In Bullet Format When Appropriate: No
Number Of Freeze-Thaw Cycles: 1 freeze-thaw cycle
Show Aperture Variable?: Yes
Consent: The patient's consent was obtained including but not limited to risks of crusting, scabbing, blistering, scarring, darker or lighter pigmentary change, recurrence, incomplete removal and infection.
Duration Of Freeze Thaw-Cycle (Seconds): 0
Post-Care Instructions: I reviewed with the patient in detail post-care instructions. Patient is to wear sunprotection, and avoid picking at any of the treated lesions. Pt may apply Vaseline to crusted or scabbing areas.
Detail Level: Detailed

## 2023-08-09 NOTE — PROCEDURES
Ms. Spence presented with snoring and non-refreshing sleep. She has mild persistent asthma but does not have medical or sleep diagnoses that would contraindicate a home sleep test.    7 hours of data are available for review and the information appears to be of good quality for analysis.    The apnea hypopnea index is 14.3 events per hour, consisting almost exclusively of hypopnea episodes with a single obstructive apnea. The lowest arterial oxygen saturation is 82% on room air with an oxygen desaturation index of 17.7. She spends about 1% of the study time, or 3 minutes in total, with a saturation less than 89%. The heart rate varies 62-94 bpm. Frequent snoring is identified.    Assessment:   Mild obstructive sleep apnea hypopnea with an apnea hypopnea index of 14.3 events per hour and a lowest arterial oxygen saturation of 82% on room air.    Recommendations:   Airway pressurization, either guided by a titration polysomnogram or initiated through the use of auto titrating CPAP. Alternative treatments for sleep-disordered breathing include reconstructive otolaryngologic surgery, dental appliances and weight loss. If any these latter treatment options are selected, a follow-up polysomnogram is suggested to assess the efficacy of therapy. Behavioral measures including avoidance of sedatives, alcohol and supine body position may be of additional benefit.   Advancement Flap (Single) Text: The defect edges were debeveled with a #15 scalpel blade.  Given the location of the defect and the proximity to free margins a single advancement flap was deemed most appropriate.  Using a sterile surgical marker, an appropriate advancement flap was drawn incorporating the defect and placing the expected incisions within the relaxed skin tension lines where possible.    The area thus outlined was incised deep to adipose tissue with a #15 scalpel blade.  The skin margins were undermined to an appropriate distance in all directions utilizing iris scissors.

## 2023-12-19 ENCOUNTER — APPOINTMENT (RX ONLY)
Dept: URBAN - METROPOLITAN AREA CLINIC 22 | Facility: CLINIC | Age: 58
Setting detail: DERMATOLOGY
End: 2023-12-19

## 2023-12-19 DIAGNOSIS — D22 MELANOCYTIC NEVI: ICD-10-CM

## 2023-12-19 DIAGNOSIS — L72.0 EPIDERMAL CYST: ICD-10-CM

## 2023-12-19 DIAGNOSIS — L82.1 OTHER SEBORRHEIC KERATOSIS: ICD-10-CM

## 2023-12-19 DIAGNOSIS — L57.0 ACTINIC KERATOSIS: ICD-10-CM

## 2023-12-19 DIAGNOSIS — Z71.89 OTHER SPECIFIED COUNSELING: ICD-10-CM

## 2023-12-19 DIAGNOSIS — L81.4 OTHER MELANIN HYPERPIGMENTATION: ICD-10-CM

## 2023-12-19 PROBLEM — D22.5 MELANOCYTIC NEVI OF TRUNK: Status: ACTIVE | Noted: 2023-12-19

## 2023-12-19 PROBLEM — D23.4 OTHER BENIGN NEOPLASM OF SKIN OF SCALP AND NECK: Status: ACTIVE | Noted: 2023-12-19

## 2023-12-19 PROCEDURE — ? SUNSCREEN TREATMENT REGIMEN

## 2023-12-19 PROCEDURE — ? PRESCRIPTION

## 2023-12-19 PROCEDURE — ? COUNSELING

## 2023-12-19 PROCEDURE — 99213 OFFICE O/P EST LOW 20 MIN: CPT

## 2023-12-19 RX ORDER — FLUOROURACIL 5 MG/G
CREAM TOPICAL
Qty: 40 | Refills: 0 | Status: ERX | COMMUNITY
Start: 2023-12-19

## 2023-12-19 RX ADMIN — FLUOROURACIL: 5 CREAM TOPICAL at 00:00

## 2023-12-19 ASSESSMENT — LOCATION ZONE DERM
LOCATION ZONE: ARM
LOCATION ZONE: NOSE
LOCATION ZONE: FACE
LOCATION ZONE: VULVA
LOCATION ZONE: TRUNK
LOCATION ZONE: SCALP
LOCATION ZONE: VULVA

## 2023-12-19 ASSESSMENT — LOCATION SIMPLE DESCRIPTION DERM
LOCATION SIMPLE: RIGHT EYEBROW
LOCATION SIMPLE: LEFT SHOULDER
LOCATION SIMPLE: RIGHT UPPER ARM
LOCATION SIMPLE: UPPER BACK
LOCATION SIMPLE: NOSE
LOCATION SIMPLE: RIGHT UPPER BACK
LOCATION SIMPLE: LEFT UPPER BACK
LOCATION SIMPLE: RIGHT SCALP
LOCATION SIMPLE: RIGHT SHOULDER
LOCATION SIMPLE: CHEST
LOCATION SIMPLE: INFERIOR FOREHEAD
LOCATION SIMPLE: VULVA
LOCATION SIMPLE: VULVA
LOCATION SIMPLE: LEFT UPPER ARM

## 2023-12-19 ASSESSMENT — LOCATION DETAILED DESCRIPTION DERM
LOCATION DETAILED: RIGHT LATERAL FRONTAL SCALP
LOCATION DETAILED: NASAL DORSUM
LOCATION DETAILED: RIGHT LABIA MINORA
LOCATION DETAILED: RIGHT LABIA MINORA
LOCATION DETAILED: LEFT ANTERIOR DISTAL UPPER ARM
LOCATION DETAILED: INFERIOR THORACIC SPINE
LOCATION DETAILED: RIGHT POSTERIOR SHOULDER
LOCATION DETAILED: LEFT SUPERIOR MEDIAL UPPER BACK
LOCATION DETAILED: RIGHT CENTRAL EYEBROW
LOCATION DETAILED: SUPERIOR THORACIC SPINE
LOCATION DETAILED: LEFT POSTERIOR SHOULDER
LOCATION DETAILED: STERNAL NOTCH
LOCATION DETAILED: RIGHT ANTERIOR PROXIMAL UPPER ARM
LOCATION DETAILED: INFERIOR MID FOREHEAD
LOCATION DETAILED: RIGHT INFERIOR MEDIAL UPPER BACK

## 2025-01-07 ENCOUNTER — APPOINTMENT (OUTPATIENT)
Dept: URBAN - METROPOLITAN AREA CLINIC 22 | Facility: CLINIC | Age: 60
Setting detail: DERMATOLOGY
End: 2025-01-07

## 2025-01-07 DIAGNOSIS — L82.1 OTHER SEBORRHEIC KERATOSIS: ICD-10-CM

## 2025-01-07 DIAGNOSIS — Z71.89 OTHER SPECIFIED COUNSELING: ICD-10-CM

## 2025-01-07 DIAGNOSIS — D22 MELANOCYTIC NEVI: ICD-10-CM

## 2025-01-07 DIAGNOSIS — L82.0 INFLAMED SEBORRHEIC KERATOSIS: ICD-10-CM

## 2025-01-07 DIAGNOSIS — L81.4 OTHER MELANIN HYPERPIGMENTATION: ICD-10-CM

## 2025-01-07 DIAGNOSIS — F42.4 EXCORIATION (SKIN-PICKING) DISORDER: ICD-10-CM

## 2025-01-07 PROBLEM — D22.5 MELANOCYTIC NEVI OF TRUNK: Status: ACTIVE | Noted: 2025-01-07

## 2025-01-07 PROCEDURE — ? COUNSELING

## 2025-01-07 PROCEDURE — ? SUNSCREEN TREATMENT REGIMEN

## 2025-01-07 PROCEDURE — 99213 OFFICE O/P EST LOW 20 MIN: CPT

## 2025-01-07 PROCEDURE — ? ADDITIONAL NOTES

## 2025-01-07 ASSESSMENT — LOCATION DETAILED DESCRIPTION DERM
LOCATION DETAILED: INFERIOR MID FOREHEAD
LOCATION DETAILED: RIGHT ANTERIOR PROXIMAL UPPER ARM
LOCATION DETAILED: RIGHT LATERAL PROXIMAL PRETIBIAL REGION
LOCATION DETAILED: SUPERIOR THORACIC SPINE
LOCATION DETAILED: LEFT POSTERIOR SHOULDER
LOCATION DETAILED: RIGHT PROXIMAL PRETIBIAL REGION
LOCATION DETAILED: RIGHT CENTRAL EYEBROW
LOCATION DETAILED: RIGHT POSTERIOR SHOULDER
LOCATION DETAILED: LEFT DISTAL PRETIBIAL REGION
LOCATION DETAILED: INFERIOR THORACIC SPINE
LOCATION DETAILED: RIGHT LATERAL EYEBROW
LOCATION DETAILED: STERNAL NOTCH
LOCATION DETAILED: RIGHT INFERIOR MEDIAL UPPER BACK
LOCATION DETAILED: LEFT ANTERIOR DISTAL UPPER ARM
LOCATION DETAILED: LEFT SUPERIOR MEDIAL UPPER BACK
LOCATION DETAILED: RIGHT LATERAL FRONTAL SCALP
LOCATION DETAILED: LEFT MID-UPPER BACK
LOCATION DETAILED: RIGHT LATERAL BUCCAL CHEEK

## 2025-01-07 ASSESSMENT — LOCATION SIMPLE DESCRIPTION DERM
LOCATION SIMPLE: CHEST
LOCATION SIMPLE: INFERIOR FOREHEAD
LOCATION SIMPLE: RIGHT CHEEK
LOCATION SIMPLE: RIGHT SHOULDER
LOCATION SIMPLE: RIGHT PRETIBIAL REGION
LOCATION SIMPLE: UPPER BACK
LOCATION SIMPLE: RIGHT SCALP
LOCATION SIMPLE: RIGHT EYEBROW
LOCATION SIMPLE: RIGHT UPPER ARM
LOCATION SIMPLE: LEFT PRETIBIAL REGION
LOCATION SIMPLE: RIGHT UPPER BACK
LOCATION SIMPLE: LEFT UPPER BACK
LOCATION SIMPLE: LEFT SHOULDER
LOCATION SIMPLE: LEFT UPPER ARM

## 2025-01-07 ASSESSMENT — LOCATION ZONE DERM
LOCATION ZONE: LEG
LOCATION ZONE: TRUNK
LOCATION ZONE: FACE
LOCATION ZONE: SCALP
LOCATION ZONE: ARM

## 2025-01-07 NOTE — PROCEDURE: ADDITIONAL NOTES
Render Risk Assessment In Note?: no
Additional Notes: If not resolved in a month will plan to re evaluate
Detail Level: Simple
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
